# Patient Record
Sex: FEMALE | Race: WHITE | Employment: OTHER | URBAN - METROPOLITAN AREA
[De-identification: names, ages, dates, MRNs, and addresses within clinical notes are randomized per-mention and may not be internally consistent; named-entity substitution may affect disease eponyms.]

---

## 2017-03-13 ENCOUNTER — OFFICE VISIT (OUTPATIENT)
Dept: FAMILY MEDICINE CLINIC | Facility: CLINIC | Age: 72
End: 2017-03-13

## 2017-03-13 VITALS
BODY MASS INDEX: 37.49 KG/M2 | TEMPERATURE: 98 F | HEART RATE: 100 BPM | SYSTOLIC BLOOD PRESSURE: 128 MMHG | DIASTOLIC BLOOD PRESSURE: 70 MMHG | HEIGHT: 65 IN | WEIGHT: 225 LBS

## 2017-03-13 DIAGNOSIS — F32.A DEPRESSION, UNSPECIFIED DEPRESSION TYPE: ICD-10-CM

## 2017-03-13 DIAGNOSIS — Z12.31 VISIT FOR SCREENING MAMMOGRAM: ICD-10-CM

## 2017-03-13 DIAGNOSIS — Z98.890 S/P SKIN BIOPSY: ICD-10-CM

## 2017-03-13 DIAGNOSIS — Q63.1 HORSESHOE KIDNEY: ICD-10-CM

## 2017-03-13 DIAGNOSIS — Z87.81 HISTORY OF ARM FRACTURE: ICD-10-CM

## 2017-03-13 DIAGNOSIS — I10 ESSENTIAL HYPERTENSION: ICD-10-CM

## 2017-03-13 DIAGNOSIS — H91.8X3 OTHER SPECIFIED HEARING LOSS OF BOTH EARS: ICD-10-CM

## 2017-03-13 DIAGNOSIS — Z00.00 ENCOUNTER FOR ANNUAL HEALTH EXAMINATION: Primary | ICD-10-CM

## 2017-03-13 DIAGNOSIS — E66.9 OBESITY (BMI 35.0-39.9 WITHOUT COMORBIDITY): ICD-10-CM

## 2017-03-13 DIAGNOSIS — L81.9 CHANGE IN MULTIPLE PIGMENTED SKIN LESIONS: ICD-10-CM

## 2017-03-13 PROCEDURE — 99397 PER PM REEVAL EST PAT 65+ YR: CPT | Performed by: FAMILY MEDICINE

## 2017-03-13 PROCEDURE — G0439 PPPS, SUBSEQ VISIT: HCPCS | Performed by: FAMILY MEDICINE

## 2017-03-13 PROCEDURE — 96160 PT-FOCUSED HLTH RISK ASSMT: CPT | Performed by: FAMILY MEDICINE

## 2017-03-13 RX ORDER — CITALOPRAM 20 MG/1
20 TABLET ORAL DAILY
Qty: 90 TABLET | Refills: 1 | Status: SHIPPED | OUTPATIENT
Start: 2017-03-13 | End: 2017-09-15

## 2017-03-13 RX ORDER — METOPROLOL SUCCINATE 50 MG/1
50 TABLET, EXTENDED RELEASE ORAL DAILY
Qty: 90 TABLET | Refills: 1 | Status: SHIPPED | OUTPATIENT
Start: 2017-03-13 | End: 2017-12-06

## 2017-03-13 NOTE — PROGRESS NOTES
HPI:   Kelly Rosales is a 81year old female who presents for a Medicare Subsequent Annual Wellness visit (Pt already had Initial Annual Wellness). Goes to skin doctor in [facility] - she did several skin lesions. Now having one grow back in the same area. Succinate ER 50 MG Oral Tablet 24 Hr Take 1 tablet (50 mg total) by mouth daily. Cholecalciferol (VITAMIN D) 2000 UNITS Oral Cap Take by mouth. Fish Oil-Cholecalciferol (FISH OIL + D3 OR) Take by mouth.    aspirin 81 MG Oral Tab Take 81 mg by mouth briseyda for AWV/SWV)    Questionnaire     Visual Acuity - following with eye doctor, s/p cataract surgery, last seen in September.    Right Eye Visual Acuity: Uncorrected Right Eye Chart Acuity: 20/100   Left Eye Visual Acuity: Uncorrected Left Eye Chart Acuity: 20 Slade Hernandez is a 81year old female who presents for a Medicare Assessment.      PLAN SUMMARY:   Diagnoses and all orders for this visit:    Encounter for annual health examination  - completed today   Visit for screening mammogram  -     Mammogram Digital Scr lost more than 10 pounds without trying?: 2 - No    Has your appetite been poor?: No    How does the patient maintain a good energy level?: Other    How would you describe your daily physical activity?: Light    How would you describe your current health s Advance Directives     Do you have a healthcare power of ?: No    Do you have a living will?: No     Please go to \"Cognitive Assessment\" under Medicare Assessment section in Charting, test patient and document.     Then, refresh your progress found for: CHLAMYDIA No flowsheet data found.     Screening Mammogram      Mammogram Annually to 78, then as discussed Mammogram,1 Yr due on 01/12/1987 Update Health Maintenance if applicable     Immunizations      Influenza No orders found for this or any

## 2017-03-13 NOTE — PATIENT INSTRUCTIONS
Tang Johns's SCREENING SCHEDULE   Tests on this list are recommended by your physician but may not be covered, or covered at this frequency, by your insurer. Please check with your insurance carrier before scheduling to verify coverage.    PREVENTATIVE Sigmoidoscopy Screen  Covered every 5 years No results found for this or any previous visit. No flowsheet data found. Fecal Occult Blood   Covered Annually No results found for: FOB, OCCULTSTOOL No flowsheet data found.      Barium Enema-   uncomfortabl for Moderate/High Risk       No orders found for this or any previous visit.  Medium/high risk factors:   End-stage renal disease   Hemophiliacs who received Factor VIII or IX concentrates   Clients of institutions for the mentally retarded   Persons who li

## 2017-03-15 ENCOUNTER — NURSE ONLY (OUTPATIENT)
Dept: FAMILY MEDICINE CLINIC | Facility: CLINIC | Age: 72
End: 2017-03-15

## 2017-03-15 DIAGNOSIS — N28.9 FUNCTION KIDNEY DECREASED: ICD-10-CM

## 2017-03-15 DIAGNOSIS — F32.A DEPRESSION, UNSPECIFIED DEPRESSION TYPE: ICD-10-CM

## 2017-03-15 DIAGNOSIS — E66.9 OBESITY (BMI 35.0-39.9 WITHOUT COMORBIDITY): ICD-10-CM

## 2017-03-15 PROCEDURE — 80061 LIPID PANEL: CPT | Performed by: FAMILY MEDICINE

## 2017-03-15 PROCEDURE — 80053 COMPREHEN METABOLIC PANEL: CPT | Performed by: FAMILY MEDICINE

## 2017-03-15 PROCEDURE — 85025 COMPLETE CBC W/AUTO DIFF WBC: CPT | Performed by: FAMILY MEDICINE

## 2017-03-15 PROCEDURE — 84443 ASSAY THYROID STIM HORMONE: CPT | Performed by: FAMILY MEDICINE

## 2017-03-15 PROCEDURE — 82306 VITAMIN D 25 HYDROXY: CPT | Performed by: FAMILY MEDICINE

## 2017-03-15 PROCEDURE — 83036 HEMOGLOBIN GLYCOSYLATED A1C: CPT | Performed by: FAMILY MEDICINE

## 2017-03-16 DIAGNOSIS — E66.01 MORBID OBESITY DUE TO EXCESS CALORIES (HCC): Primary | ICD-10-CM

## 2017-03-16 DIAGNOSIS — R73.03 PREDIABETES: ICD-10-CM

## 2017-03-16 LAB
25-HYDROXYVITAMIN D (TOTAL): 33.9 NG/ML (ref 30–100)
ALBUMIN SERPL-MCNC: 3.7 G/DL (ref 3.5–4.8)
ALP LIVER SERPL-CCNC: 69 U/L (ref 55–142)
ALT SERPL-CCNC: 23 U/L (ref 14–54)
AST SERPL-CCNC: 19 U/L (ref 15–41)
BASOPHILS # BLD AUTO: 0.05 X10(3) UL (ref 0–0.1)
BASOPHILS NFR BLD AUTO: 0.8 %
BILIRUB SERPL-MCNC: 0.3 MG/DL (ref 0.1–2)
BUN BLD-MCNC: 20 MG/DL (ref 8–20)
CALCIUM BLD-MCNC: 8.8 MG/DL (ref 8.3–10.3)
CHLORIDE: 107 MMOL/L (ref 101–111)
CHOLEST SMN-MCNC: 194 MG/DL (ref ?–200)
CO2: 29 MMOL/L (ref 22–32)
CREAT BLD-MCNC: 1.13 MG/DL (ref 0.55–1.02)
EOSINOPHIL # BLD AUTO: 0.14 X10(3) UL (ref 0–0.3)
EOSINOPHIL NFR BLD AUTO: 2.2 %
ERYTHROCYTE [DISTWIDTH] IN BLOOD BY AUTOMATED COUNT: 12.9 % (ref 11.5–16)
EST. AVERAGE GLUCOSE BLD GHB EST-MCNC: 128 MG/DL (ref 68–126)
GLUCOSE BLD-MCNC: 115 MG/DL (ref 70–99)
HBA1C MFR BLD HPLC: 6.1 % (ref ?–5.7)
HCT VFR BLD AUTO: 37.9 % (ref 34–50)
HDLC SERPL-MCNC: 52 MG/DL (ref 45–?)
HDLC SERPL: 3.73 {RATIO} (ref ?–4.44)
HGB BLD-MCNC: 11.9 G/DL (ref 12–16)
IMMATURE GRANULOCYTE COUNT: 0.01 X10(3) UL (ref 0–1)
IMMATURE GRANULOCYTE RATIO %: 0.2 %
LDLC SERPL CALC-MCNC: 108 MG/DL (ref ?–130)
LYMPHOCYTES # BLD AUTO: 2.25 X10(3) UL (ref 0.9–4)
LYMPHOCYTES NFR BLD AUTO: 35.2 %
M PROTEIN MFR SERPL ELPH: 7.5 G/DL (ref 6.1–8.3)
MCH RBC QN AUTO: 27.3 PG (ref 27–33.2)
MCHC RBC AUTO-ENTMCNC: 31.4 G/DL (ref 31–37)
MCV RBC AUTO: 86.9 FL (ref 81–100)
MONOCYTES # BLD AUTO: 0.46 X10(3) UL (ref 0.1–0.6)
MONOCYTES NFR BLD AUTO: 7.2 %
NEUTROPHIL ABS PRELIM: 3.48 X10 (3) UL (ref 1.3–6.7)
NEUTROPHILS # BLD AUTO: 3.48 X10(3) UL (ref 1.3–6.7)
NEUTROPHILS NFR BLD AUTO: 54.4 %
NONHDLC SERPL-MCNC: 142 MG/DL (ref ?–130)
PLATELET # BLD AUTO: 262 10(3)UL (ref 150–450)
POTASSIUM SERPL-SCNC: 4 MMOL/L (ref 3.6–5.1)
RBC # BLD AUTO: 4.36 X10(6)UL (ref 3.8–5.1)
RED CELL DISTRIBUTION WIDTH-SD: 40.7 FL (ref 35.1–46.3)
SODIUM SERPL-SCNC: 143 MMOL/L (ref 136–144)
TRIGLYCERIDES: 170 MG/DL (ref ?–150)
TSI SER-ACNC: 3.44 MIU/ML (ref 0.35–5.5)
VLDL: 34 MG/DL (ref 5–40)
WBC # BLD AUTO: 6.4 X10(3) UL (ref 4–13)

## 2017-03-16 NOTE — TELEPHONE ENCOUNTER
Called and spoke with pt regarding labs. Still in prediabetic range. Cholesterol is good. Renal function stable. Blood cell counts good. Pt still would like to go back on qsymia, scripts printed. Can these be faxed or does she need to ?  Pt advised

## 2017-03-16 NOTE — TELEPHONE ENCOUNTER
Left message on voicemail/answering machine for patient to call office    Both scripts have been faxed.   Will need to take lower dose Qsymia 3.75-23 for 2 weeks first, then increase to 7.5-46

## 2017-03-20 NOTE — TELEPHONE ENCOUNTER
Qsymia 3.75 sent as #30  1 refill,  Qsymia 7.5 sent for 14 days only. Will need to resend 3.75 as #14 and 7.5 as #30  1 refill    Per pharmacy patient has not picked up script yet.     Routed to Dr Chiquis Hansen to place order

## 2017-04-03 ENCOUNTER — OFFICE VISIT (OUTPATIENT)
Dept: FAMILY MEDICINE CLINIC | Facility: CLINIC | Age: 72
End: 2017-04-03

## 2017-04-03 VITALS
TEMPERATURE: 98 F | DIASTOLIC BLOOD PRESSURE: 66 MMHG | BODY MASS INDEX: 38 KG/M2 | SYSTOLIC BLOOD PRESSURE: 130 MMHG | WEIGHT: 226.63 LBS | OXYGEN SATURATION: 98 % | HEART RATE: 112 BPM | RESPIRATION RATE: 16 BRPM

## 2017-04-03 DIAGNOSIS — R06.00 DOE (DYSPNEA ON EXERTION): Primary | ICD-10-CM

## 2017-04-03 DIAGNOSIS — R00.0 TACHYCARDIA: ICD-10-CM

## 2017-04-03 DIAGNOSIS — R42 VERTIGO: ICD-10-CM

## 2017-04-03 PROBLEM — E66.01 SEVERE OBESITY (BMI 35.0-39.9) WITH COMORBIDITY (HCC): Chronic | Status: ACTIVE | Noted: 2017-04-03

## 2017-04-03 PROCEDURE — 99214 OFFICE O/P EST MOD 30 MIN: CPT | Performed by: FAMILY MEDICINE

## 2017-04-03 PROCEDURE — 93000 ELECTROCARDIOGRAM COMPLETE: CPT | Performed by: FAMILY MEDICINE

## 2017-04-03 RX ORDER — MECLIZINE HYDROCHLORIDE CHEWABLE TABLETS 25 MG/1
25 TABLET, CHEWABLE ORAL 3 TIMES DAILY PRN
Qty: 30 TABLET | Refills: 0 | Status: SHIPPED | OUTPATIENT
Start: 2017-04-03 | End: 2017-07-19 | Stop reason: ALTCHOICE

## 2017-04-03 RX ORDER — PREDNISONE 20 MG/1
20 TABLET ORAL DAILY
Qty: 5 TABLET | Refills: 0 | Status: SHIPPED | OUTPATIENT
Start: 2017-04-03 | End: 2017-04-08

## 2017-04-03 NOTE — PROGRESS NOTES
Basia Vergara is a 81year old female. Patient presents with:  Dizziness: balance problems, head hurts per pt, vision changes      HPI:   Started getting sick 3 weeks ago. Started vomiting 3 weeks ago, then again this weekend. Minimal diarrhea.  Since then by mouth daily. Disp:  Rfl:    CINNAMON OR Take by mouth. Disp:  Rfl:    B Complex Vitamins (VITAMIN B COMPLEX OR) Inject as directed.  Disp:  Rfl:       Past Medical History   Diagnosis Date   • Arm fracture, right 2016   • Diabetes ([facility] [address]    • Depression Report -IN OFFICE [70300]    Vertigo  -     predniSONE 20 MG Oral Tab; Take 1 tablet (20 mg total) by mouth daily.  -     Meclizine HCl 25 MG Oral Chew Tab; Chew 25 mg by mouth 3 (three) times daily as needed.   - EKG as above, nothing concerning.   - treat

## 2017-04-17 ENCOUNTER — MED REC SCAN ONLY (OUTPATIENT)
Dept: FAMILY MEDICINE CLINIC | Facility: CLINIC | Age: 72
End: 2017-04-17

## 2017-06-13 ENCOUNTER — MED REC SCAN ONLY (OUTPATIENT)
Dept: FAMILY MEDICINE CLINIC | Facility: CLINIC | Age: 72
End: 2017-06-13

## 2017-07-19 ENCOUNTER — OFFICE VISIT (OUTPATIENT)
Dept: FAMILY MEDICINE CLINIC | Facility: CLINIC | Age: 72
End: 2017-07-19

## 2017-07-19 VITALS
TEMPERATURE: 98 F | DIASTOLIC BLOOD PRESSURE: 88 MMHG | RESPIRATION RATE: 16 BRPM | HEART RATE: 104 BPM | SYSTOLIC BLOOD PRESSURE: 130 MMHG

## 2017-07-19 DIAGNOSIS — I10 ESSENTIAL HYPERTENSION: ICD-10-CM

## 2017-07-19 DIAGNOSIS — F32.A DEPRESSION, UNSPECIFIED DEPRESSION TYPE: Primary | ICD-10-CM

## 2017-07-19 PROCEDURE — 99214 OFFICE O/P EST MOD 30 MIN: CPT | Performed by: FAMILY MEDICINE

## 2017-07-19 NOTE — PROGRESS NOTES
Hao Lafleur is a 81year old female.   HPI:   Lb,is here for follow up on depression related to her adult daughter who lives with her and her two young sons, Tang Anderson takes care of the boys and her daughter has psychological issues that incapacitated and Tang Anderson t suspicious lesions  HEENT: atraumatic, normocephalic,ears and throat are clear  NECK: supple,no adenopathy,no bruits  LUNGS: clear to auscultation  CARDIO: RRR without murmur  GI: good BS's,no masses, HSM or tenderness  EXTREMITIES: no cyanosis, clubbing o

## 2017-09-05 RX ORDER — PHENTERMINE AND TOPIRAMATE 7.5; 46 MG/1; MG/1
CAPSULE, EXTENDED RELEASE ORAL
Qty: 30 CAPSULE | Refills: 0 | Status: SHIPPED | OUTPATIENT
Start: 2017-09-05 | End: 2018-07-16

## 2017-09-15 DIAGNOSIS — F32.A DEPRESSION, UNSPECIFIED DEPRESSION TYPE: ICD-10-CM

## 2017-09-15 RX ORDER — CITALOPRAM 20 MG/1
TABLET ORAL
Qty: 90 TABLET | Refills: 0 | Status: SHIPPED | OUTPATIENT
Start: 2017-09-15 | End: 2017-09-16

## 2017-09-15 NOTE — TELEPHONE ENCOUNTER
Script sent. Looks like Dr. Gonzalez Chinchilla adjusted her dose to 30 mg. Can she follow up with me? Did I send in the right dose? Is that what she is taking?

## 2017-09-16 RX ORDER — CITALOPRAM 20 MG/1
30 TABLET ORAL
Qty: 135 TABLET | Refills: 0 | Status: SHIPPED | OUTPATIENT
Start: 2017-09-16 | End: 2017-09-25

## 2017-09-16 NOTE — TELEPHONE ENCOUNTER
Patient states that she is taking 1.5 tabs daily to = 30 mg. Appointment scheduled.    Future Appointments  Date Time Provider Justine Humphries   9/25/2017 8:00 AM Abran Hudson [facility] ALEKS Clarke

## 2017-09-25 ENCOUNTER — OFFICE VISIT (OUTPATIENT)
Dept: FAMILY MEDICINE CLINIC | Facility: CLINIC | Age: 72
End: 2017-09-25

## 2017-09-25 VITALS
SYSTOLIC BLOOD PRESSURE: 124 MMHG | RESPIRATION RATE: 16 BRPM | WEIGHT: 225 LBS | HEART RATE: 70 BPM | BODY MASS INDEX: 37 KG/M2 | TEMPERATURE: 99 F | DIASTOLIC BLOOD PRESSURE: 70 MMHG

## 2017-09-25 DIAGNOSIS — I10 ESSENTIAL HYPERTENSION: ICD-10-CM

## 2017-09-25 DIAGNOSIS — F32.A DEPRESSION, UNSPECIFIED DEPRESSION TYPE: Primary | ICD-10-CM

## 2017-09-25 DIAGNOSIS — E66.01 SEVERE OBESITY (BMI 35.0-39.9) WITH COMORBIDITY (HCC): Chronic | ICD-10-CM

## 2017-09-25 DIAGNOSIS — Z82.49 FAMILY HISTORY OF HEART DISEASE: ICD-10-CM

## 2017-09-25 PROCEDURE — 99214 OFFICE O/P EST MOD 30 MIN: CPT | Performed by: FAMILY MEDICINE

## 2017-09-25 RX ORDER — CITALOPRAM 20 MG/1
30 TABLET ORAL
Qty: 135 TABLET | Refills: 0 | Status: SHIPPED | OUTPATIENT
Start: 2017-09-25 | End: 2018-07-16

## 2017-09-25 NOTE — PROGRESS NOTES
Denis Hernandez is a 81year old female. Patient presents with:  Medication Follow-Up: per pt      HPI:   Her daughter and her two kids live with her. 5year old twins.  Happier now, \"less groaning and moaning\" getting up in the AM. Has generally be nando Alcohol use: Yes           0.0 oz/week     Comment: occ       BP Readings from Last 6 Encounters:  09/25/17 : 124/70  07/19/17 : 130/88  04/03/17 : 130/66  03/13/17 : 128/70  04/19/16 : 124/82      Wt Readings from Last 6 Encounters:  09/ orders of the defined types were placed in this encounter. Meds & Refills for this Visit:  Signed Prescriptions Disp Refills    Citalopram Hydrobromide 20 MG Oral Tab 135 tablet 0      Sig: Take 1.5 tablets (30 mg total) by mouth once daily.

## 2017-09-27 ENCOUNTER — PATIENT OUTREACH (OUTPATIENT)
Dept: FAMILY MEDICINE CLINIC | Facility: CLINIC | Age: 72
End: 2017-09-27

## 2017-09-27 NOTE — PROGRESS NOTES
Betzy Davila is due for mammogram.   Last mammogram date:  10/19/15  Also due for colonoscopy. Mychart/Letter sent to patient.

## 2017-09-29 ENCOUNTER — TELEPHONE (OUTPATIENT)
Dept: FAMILY MEDICINE CLINIC | Facility: CLINIC | Age: 72
End: 2017-09-29

## 2017-09-29 NOTE — TELEPHONE ENCOUNTER
Mammogram results received from [facility] and reviewed by Dr Bob Mazariegos. Per Dr Bob Mazariegos, normal mammogram. Repeat in 1 year    Patient notified via detailed voicemail left at cell number (ok per  HIPAA consent)    Report sent to scanning.

## 2017-12-06 ENCOUNTER — OFFICE VISIT (OUTPATIENT)
Dept: FAMILY MEDICINE CLINIC | Facility: CLINIC | Age: 72
End: 2017-12-06

## 2017-12-06 VITALS
BODY MASS INDEX: 37.12 KG/M2 | SYSTOLIC BLOOD PRESSURE: 130 MMHG | HEIGHT: 66 IN | WEIGHT: 231 LBS | HEART RATE: 84 BPM | TEMPERATURE: 99 F | DIASTOLIC BLOOD PRESSURE: 88 MMHG | RESPIRATION RATE: 16 BRPM

## 2017-12-06 DIAGNOSIS — E66.01 SEVERE OBESITY (BMI 35.0-39.9) WITH COMORBIDITY (HCC): Primary | ICD-10-CM

## 2017-12-06 DIAGNOSIS — I10 ESSENTIAL HYPERTENSION: ICD-10-CM

## 2017-12-06 DIAGNOSIS — Q63.1 HORSESHOE KIDNEY: ICD-10-CM

## 2017-12-06 PROCEDURE — 80061 LIPID PANEL: CPT | Performed by: FAMILY MEDICINE

## 2017-12-06 PROCEDURE — 80053 COMPREHEN METABOLIC PANEL: CPT | Performed by: FAMILY MEDICINE

## 2017-12-06 PROCEDURE — 87086 URINE CULTURE/COLONY COUNT: CPT | Performed by: FAMILY MEDICINE

## 2017-12-06 PROCEDURE — 36415 COLL VENOUS BLD VENIPUNCTURE: CPT | Performed by: FAMILY MEDICINE

## 2017-12-06 PROCEDURE — 81001 URINALYSIS AUTO W/SCOPE: CPT | Performed by: FAMILY MEDICINE

## 2017-12-06 PROCEDURE — 85025 COMPLETE CBC W/AUTO DIFF WBC: CPT | Performed by: FAMILY MEDICINE

## 2017-12-06 PROCEDURE — 99214 OFFICE O/P EST MOD 30 MIN: CPT | Performed by: FAMILY MEDICINE

## 2017-12-06 PROCEDURE — 84443 ASSAY THYROID STIM HORMONE: CPT | Performed by: FAMILY MEDICINE

## 2017-12-06 PROCEDURE — 83036 HEMOGLOBIN GLYCOSYLATED A1C: CPT | Performed by: FAMILY MEDICINE

## 2017-12-06 RX ORDER — METOPROLOL SUCCINATE 50 MG/1
TABLET, EXTENDED RELEASE ORAL
Qty: 90 TABLET | Refills: 3 | Status: SHIPPED | OUTPATIENT
Start: 2017-12-06 | End: 2018-11-20

## 2017-12-06 NOTE — PROGRESS NOTES
Oralia Ganser is a 81year old female. Patient presents with:  Weight Problem: gaining weight per pt      HPI:   Has been obese most of her life. Has trouble walking, getting slower than she was. Has been to [facility], [facility].  He t Last 6 Encounters:  12/06/17 : 231 lb  09/25/17 : 225 lb  04/03/17 : 226 lb 9.6 oz  03/13/17 : 225 lb  04/19/16 : 215 lb      REVIEW OF SYSTEMS:   GENERAL HEALTH: feels well no complaints  SKIN: denies any unusual skin lesions or rashes   RESPIRATORY: vivian requested or ordered in this encounter          The patient indicates understanding of these issues and agrees to the plan.

## 2017-12-06 NOTE — TELEPHONE ENCOUNTER
LOV   09/25/2017  Last refill  03/13/2017   #90 w. 1 RF  Future Appointments  Date Time Provider Justine Humphries   12/6/2017 11:00 AM DO JOSE Harris [facility] Bhavna Garner

## 2017-12-07 ENCOUNTER — TELEPHONE (OUTPATIENT)
Dept: FAMILY MEDICINE CLINIC | Facility: CLINIC | Age: 72
End: 2017-12-07

## 2017-12-07 DIAGNOSIS — Z00.00 PREVENTATIVE HEALTH CARE: Primary | ICD-10-CM

## 2017-12-07 NOTE — TELEPHONE ENCOUNTER
Provider called, pt had some labs drawn there and they need the pt to come back for a redraw. Provider would like to discuss with a nurse.    Please call Wilmar at [phone]

## 2017-12-08 NOTE — TELEPHONE ENCOUNTER
----- Message from Carla Fitzgerald DO sent at 12/8/2017  9:25 AM CST -----  Pls let pt know: Looks like specimen was hemolyzed and lab suggested re-drawing her for more accurate results. The thyroid is normal, and hemolysis should not affect that.  Please e

## 2017-12-08 NOTE — TELEPHONE ENCOUNTER
Pt coming in for re-draw CMP tomorrow - please place orders    Future Appointments  Date Time Provider Justine Humphries   12/9/2017 9:00 AM  [facility] Marianne Patrick

## 2017-12-09 ENCOUNTER — NURSE ONLY (OUTPATIENT)
Dept: FAMILY MEDICINE CLINIC | Facility: CLINIC | Age: 72
End: 2017-12-09

## 2017-12-09 DIAGNOSIS — Z00.00 PREVENTATIVE HEALTH CARE: ICD-10-CM

## 2017-12-09 PROCEDURE — 36415 COLL VENOUS BLD VENIPUNCTURE: CPT | Performed by: FAMILY MEDICINE

## 2017-12-09 PROCEDURE — 80053 COMPREHEN METABOLIC PANEL: CPT | Performed by: FAMILY MEDICINE

## 2018-07-10 ENCOUNTER — TELEPHONE (OUTPATIENT)
Dept: FAMILY MEDICINE CLINIC | Facility: CLINIC | Age: 73
End: 2018-07-10

## 2018-07-10 NOTE — TELEPHONE ENCOUNTER
Patient reports she is going to have lap-band procedure with Dr. Mickey Haq @ [facility]. Kaci Kilgore is seeing his nurse on Thursday, will ask her to forward his requirements for pre op labs.

## 2018-07-12 ENCOUNTER — NURSE ONLY (OUTPATIENT)
Dept: FAMILY MEDICINE CLINIC | Facility: CLINIC | Age: 73
End: 2018-07-12

## 2018-07-12 DIAGNOSIS — Z01.818 PREOP TESTING: Primary | ICD-10-CM

## 2018-07-12 DIAGNOSIS — R73.01 ELEVATED FASTING GLUCOSE: ICD-10-CM

## 2018-07-12 LAB
ALBUMIN SERPL-MCNC: 3.8 G/DL (ref 3.5–4.8)
ALP LIVER SERPL-CCNC: 69 U/L (ref 55–142)
ALT SERPL-CCNC: 25 U/L (ref 14–54)
AST SERPL-CCNC: 20 U/L (ref 15–41)
BASOPHILS # BLD AUTO: 0.04 X10(3) UL (ref 0–0.1)
BASOPHILS NFR BLD AUTO: 0.5 %
BILIRUB SERPL-MCNC: 0.2 MG/DL (ref 0.1–2)
BUN BLD-MCNC: 21 MG/DL (ref 8–20)
CALCIUM BLD-MCNC: 8.9 MG/DL (ref 8.3–10.3)
CHLORIDE: 109 MMOL/L (ref 101–111)
CO2: 26 MMOL/L (ref 22–32)
CREAT BLD-MCNC: 1.26 MG/DL (ref 0.55–1.02)
EOSINOPHIL # BLD AUTO: 0.14 X10(3) UL (ref 0–0.3)
EOSINOPHIL NFR BLD AUTO: 1.9 %
ERYTHROCYTE [DISTWIDTH] IN BLOOD BY AUTOMATED COUNT: 17.6 % (ref 11.5–16)
GLUCOSE BLD-MCNC: 139 MG/DL (ref 70–99)
HCT VFR BLD AUTO: 39.6 % (ref 34–50)
HGB BLD-MCNC: 12.1 G/DL (ref 12–16)
IMMATURE GRANULOCYTE COUNT: 0.01 X10(3) UL (ref 0–1)
IMMATURE GRANULOCYTE RATIO %: 0.1 %
LYMPHOCYTES # BLD AUTO: 2.45 X10(3) UL (ref 0.9–4)
LYMPHOCYTES NFR BLD AUTO: 32.7 %
M PROTEIN MFR SERPL ELPH: 7.6 G/DL (ref 6.1–8.3)
MCH RBC QN AUTO: 26 PG (ref 27–33.2)
MCHC RBC AUTO-ENTMCNC: 30.6 G/DL (ref 31–37)
MCV RBC AUTO: 85.2 FL (ref 81–100)
MONOCYTES # BLD AUTO: 0.35 X10(3) UL (ref 0.1–1)
MONOCYTES NFR BLD AUTO: 4.7 %
NEUTROPHIL ABS PRELIM: 4.5 X10 (3) UL (ref 1.3–6.7)
NEUTROPHILS # BLD AUTO: 4.5 X10(3) UL (ref 1.3–6.7)
NEUTROPHILS NFR BLD AUTO: 60.1 %
PLATELET # BLD AUTO: 265 10(3)UL (ref 150–450)
POTASSIUM SERPL-SCNC: 4.2 MMOL/L (ref 3.6–5.1)
RBC # BLD AUTO: 4.65 X10(6)UL (ref 3.8–5.1)
RED CELL DISTRIBUTION WIDTH-SD: 54.1 FL (ref 35.1–46.3)
SODIUM SERPL-SCNC: 143 MMOL/L (ref 136–144)
WBC # BLD AUTO: 7.5 X10(3) UL (ref 4–13)

## 2018-07-12 PROCEDURE — 36415 COLL VENOUS BLD VENIPUNCTURE: CPT | Performed by: FAMILY MEDICINE

## 2018-07-12 PROCEDURE — 83036 HEMOGLOBIN GLYCOSYLATED A1C: CPT | Performed by: FAMILY MEDICINE

## 2018-07-12 PROCEDURE — 85025 COMPLETE CBC W/AUTO DIFF WBC: CPT | Performed by: FAMILY MEDICINE

## 2018-07-12 PROCEDURE — 80053 COMPREHEN METABOLIC PANEL: CPT | Performed by: FAMILY MEDICINE

## 2018-07-12 NOTE — PROGRESS NOTES
Patient to clinic with orders for preop labs. Orders reviewed by Dr Cindy Downey who gave verbal order for CBC and CMP.   Orders placed    Mint and lavender tube drawn right AC x 1 attempt

## 2018-07-13 DIAGNOSIS — Q63.1 HORSESHOE KIDNEY: Primary | ICD-10-CM

## 2018-07-13 DIAGNOSIS — R73.01 ELEVATED FASTING GLUCOSE: Primary | ICD-10-CM

## 2018-07-13 LAB
EST. AVERAGE GLUCOSE BLD GHB EST-MCNC: 123 MG/DL (ref 68–126)
HBA1C MFR BLD HPLC: 5.9 % (ref ?–5.7)

## 2018-07-16 ENCOUNTER — OFFICE VISIT (OUTPATIENT)
Dept: FAMILY MEDICINE CLINIC | Facility: CLINIC | Age: 73
End: 2018-07-16

## 2018-07-16 VITALS
BODY MASS INDEX: 36.46 KG/M2 | WEIGHT: 218.81 LBS | HEART RATE: 88 BPM | SYSTOLIC BLOOD PRESSURE: 144 MMHG | TEMPERATURE: 98 F | DIASTOLIC BLOOD PRESSURE: 80 MMHG | RESPIRATION RATE: 10 BRPM | HEIGHT: 65 IN

## 2018-07-16 DIAGNOSIS — F32.A DEPRESSION, UNSPECIFIED DEPRESSION TYPE: ICD-10-CM

## 2018-07-16 DIAGNOSIS — E66.01 SEVERE OBESITY (BMI 35.0-39.9) WITH COMORBIDITY (HCC): Primary | ICD-10-CM

## 2018-07-16 DIAGNOSIS — Q63.1 HORSESHOE KIDNEY: ICD-10-CM

## 2018-07-16 DIAGNOSIS — I10 ESSENTIAL HYPERTENSION: ICD-10-CM

## 2018-07-16 PROBLEM — E11.9 DIABETES MELLITUS, TYPE 2 (HCC): Chronic | Status: ACTIVE | Noted: 2018-07-16

## 2018-07-16 PROBLEM — E11.9 DIABETES MELLITUS, TYPE 2 (HCC): Chronic | Status: RESOLVED | Noted: 2018-07-16 | Resolved: 2018-07-16

## 2018-07-16 PROCEDURE — 99214 OFFICE O/P EST MOD 30 MIN: CPT | Performed by: FAMILY MEDICINE

## 2018-07-16 RX ORDER — CITALOPRAM 20 MG/1
20 TABLET ORAL
Qty: 90 TABLET | Refills: 1 | Status: SHIPPED | OUTPATIENT
Start: 2018-07-16 | End: 2018-12-04

## 2018-07-16 NOTE — H&P
Lisandro Woody is a 72year old female who presents for a pre-operative physical exam. Patient is to have lap band bariatric surgery, to be done by Dr. Lalo Rosario at [facility] in [facility] on 7/24/18. HPI:   Pt complains of nothing new today. minimal.  Diet: watches minimally     REVIEW OF SYSTEMS:   GENERAL: feels well otherwise  SKIN: denies any unusual skin lesions  EYES:denies blurred vision or double vision  HEENT: denies nasal congestion, sinus pain or ST  LUNGS: denies shortness of breat hypertension     Obesity (BMI 35.0-39.9 without comorbidity)     History of arm fracture     Horseshoe kidney     Severe obesity (BMI 35.0-39. 9) with comorbidity ([address]    . Pt has no significant history of cardiac or pulmonary conditions other than HTN.  He

## 2018-07-17 ENCOUNTER — TELEPHONE (OUTPATIENT)
Dept: FAMILY MEDICINE CLINIC | Facility: CLINIC | Age: 73
End: 2018-07-17

## 2018-07-17 ENCOUNTER — NURSE ONLY (OUTPATIENT)
Dept: FAMILY MEDICINE CLINIC | Facility: CLINIC | Age: 73
End: 2018-07-17
Payer: COMMERCIAL

## 2018-07-17 DIAGNOSIS — Z01.818 PREOP TESTING: Primary | ICD-10-CM

## 2018-07-17 PROCEDURE — 93000 ELECTROCARDIOGRAM COMPLETE: CPT | Performed by: FAMILY MEDICINE

## 2018-07-17 NOTE — TELEPHONE ENCOUNTER
Surgeons office calling to request patient have an EKG done. Patient notified via detailed voicemail left at cell number (ok per  HIPAA consent) that she needs an EKG.  Advised to call office to schedule nurse visit to have test done

## 2018-07-18 NOTE — PROGRESS NOTES
EKG showed NSR, no ST changes or elevation or depression. No arrhythmias. It appeared unchanged from EKG done 4/3/17.

## 2018-09-13 ENCOUNTER — TELEPHONE (OUTPATIENT)
Dept: FAMILY MEDICINE CLINIC | Facility: CLINIC | Age: 73
End: 2018-09-13

## 2018-09-13 NOTE — TELEPHONE ENCOUNTER
Letter mailed to patient reminding her she is due for labs.   Lab Frequency Next Occurrence   BASIC METABOLIC PANEL (8) Once 79/14/3506

## 2018-10-26 ENCOUNTER — MED REC SCAN ONLY (OUTPATIENT)
Dept: FAMILY MEDICINE CLINIC | Facility: CLINIC | Age: 73
End: 2018-10-26

## 2018-11-13 ENCOUNTER — PATIENT OUTREACH (OUTPATIENT)
Dept: FAMILY MEDICINE CLINIC | Facility: CLINIC | Age: 73
End: 2018-11-13

## 2018-11-20 DIAGNOSIS — I10 ESSENTIAL HYPERTENSION: ICD-10-CM

## 2018-11-20 RX ORDER — METOPROLOL SUCCINATE 50 MG/1
TABLET, EXTENDED RELEASE ORAL
Qty: 90 TABLET | Refills: 3 | Status: SHIPPED | OUTPATIENT
Start: 2018-11-20 | End: 2018-12-04

## 2018-12-04 ENCOUNTER — OFFICE VISIT (OUTPATIENT)
Dept: FAMILY MEDICINE CLINIC | Facility: CLINIC | Age: 73
End: 2018-12-04
Payer: COMMERCIAL

## 2018-12-04 VITALS
BODY MASS INDEX: 31.76 KG/M2 | HEART RATE: 90 BPM | TEMPERATURE: 98 F | WEIGHT: 190.63 LBS | SYSTOLIC BLOOD PRESSURE: 110 MMHG | HEIGHT: 65 IN | DIASTOLIC BLOOD PRESSURE: 80 MMHG | RESPIRATION RATE: 10 BRPM

## 2018-12-04 DIAGNOSIS — I10 ESSENTIAL HYPERTENSION: ICD-10-CM

## 2018-12-04 DIAGNOSIS — E66.9 OBESITY (BMI 30.0-34.9): ICD-10-CM

## 2018-12-04 DIAGNOSIS — F32.A DEPRESSION, UNSPECIFIED DEPRESSION TYPE: ICD-10-CM

## 2018-12-04 DIAGNOSIS — Q63.1 HORSESHOE KIDNEY: ICD-10-CM

## 2018-12-04 DIAGNOSIS — Z13.6 SCREENING FOR CARDIOVASCULAR CONDITION: ICD-10-CM

## 2018-12-04 DIAGNOSIS — F33.0 MILD RECURRENT MAJOR DEPRESSION (HCC): Chronic | ICD-10-CM

## 2018-12-04 DIAGNOSIS — Z78.0 POSTMENOPAUSAL STATE: ICD-10-CM

## 2018-12-04 DIAGNOSIS — Z00.00 ENCOUNTER FOR ANNUAL HEALTH EXAMINATION: Primary | ICD-10-CM

## 2018-12-04 PROCEDURE — 99397 PER PM REEVAL EST PAT 65+ YR: CPT | Performed by: FAMILY MEDICINE

## 2018-12-04 PROCEDURE — G0439 PPPS, SUBSEQ VISIT: HCPCS | Performed by: FAMILY MEDICINE

## 2018-12-04 PROCEDURE — 96160 PT-FOCUSED HLTH RISK ASSMT: CPT | Performed by: FAMILY MEDICINE

## 2018-12-04 RX ORDER — METOPROLOL SUCCINATE 50 MG/1
50 TABLET, EXTENDED RELEASE ORAL
Qty: 90 TABLET | Refills: 3 | Status: SHIPPED | OUTPATIENT
Start: 2018-12-04 | End: 2020-01-13

## 2018-12-04 RX ORDER — CITALOPRAM 20 MG/1
20 TABLET ORAL
Qty: 90 TABLET | Refills: 1 | Status: SHIPPED | OUTPATIENT
Start: 2018-12-04 | End: 2019-10-14

## 2018-12-04 NOTE — PROGRESS NOTES
HPI:   Thais Dozier is a 72year old female who presents for a Medicare Subsequent Annual Wellness visit (Pt already had Initial Annual Wellness). Goes to skin doctor regularly, no cancerous findings but several things were treated.      Weight loss Ulisses Gonzalez, DO:  Citalopram Hydrobromide 20 MG Oral Tab Take 1 tablet (20 mg total) by mouth once daily. Metoprolol Succinate ER 50 MG Oral Tablet 24 Hr Take 1 tablet (50 mg total) by mouth once daily. aspirin 81 MG Oral Tab Take 81 mg by mouth daily. seen in September.    Right Eye Visual Acuity: Uncorrected Right Eye Chart Acuity: 20/100   Left Eye Visual Acuity: Uncorrected Left Eye Chart Acuity: 20/70   Both Eyes Visual Acuity: Uncorrected Both Eyes Chart Acuity: 20/50   Able To Tolerate Visual Acuit all orders for this visit:    1. Encounter for annual health examination  Completed today.   - COMP METABOLIC PANEL (14); Future  - CBC WITH DIFFERENTIAL WITH PLATELET; Future  - will get last colonoscopy report.    - pt does mammograms at [facility], she would you describe your daily physical activity?: Light    How would you describe your current health state?: Good    How do you maintain positive mental well-being?: Social Interaction;Games; Visiting Friends; Visiting Family    If you are a male age 38-65 section provided for quick review of chart, separate sheet to patient  [address] Internal Lab or Procedure External Lab or Procedure   Diabetes Screening      HbgA1C   Annually HgbA1C (%)   Date Value   07/12/2018 5.9 No orders found for this or any previous visit.      Tetanus Orders placed or performed in visit on 06/23/16   • TETANUS AND DIPHTHERIA, [address] Internal Lab or Procedure External Lab or Procedure   Annual [facility] AMANDA

## 2018-12-04 NOTE — PATIENT INSTRUCTIONS
Vazquez Johns's SCREENING SCHEDULE   Tests on this list are recommended by your physician but may not be covered, or covered at this frequency, by your insurer. Please check with your insurance carrier before scheduling to verify coverage.    PREVENTATIV Covered every 10 years- more often if abnormal Colonoscopy due on 01/12/1945 Update [facility] if applicable    Flex Sigmoidoscopy Screen  Covered every 5 years No results found for this or any previous visit. No flowsheet data found.      Fecal O (Pneumovax)  Covered Once after 65 No orders found for this or any previous visit. Please get once after your 65th birthday    Hepatitis B for Moderate/High Risk       No orders found for this or any previous visit.  Medium/high risk factors:   End-stage re Internal Lab or Procedure External Lab or Procedure   Diabetes Screening      HbgA1C    At Least  Annually for Diabetics HgbA1C (%)   Date Value   07/12/2018 5.9 (H)    No flowsheet data found.     Fasting Blood Sugar (FSB)   Patient must be diagnosed with found for: FOB, OCCULTSTOOL No flowsheet data found.      Barium Enema-   uncomfortable but covered  Covered but uncomfortable   Glaucoma Screening      Ophthalmology Visit   Covered annually for Diabetics, people with Glaucoma family history,  [place]os [place] concentrates   Clients of institutions for the mentally retarded   Persons who live in the same house as a HepB virus carrier   Homosexual men   Illicit injectable drug abusers     Tetanus Toxoid- Only covered with a cut with metal- TD and TDaP Not covered

## 2018-12-05 PROBLEM — F33.0 MILD RECURRENT MAJOR DEPRESSION (HCC): Chronic | Status: ACTIVE | Noted: 2018-12-05

## 2018-12-05 PROBLEM — E66.9 OBESITY (BMI 30.0-34.9): Status: ACTIVE | Noted: 2017-04-03

## 2018-12-05 PROBLEM — F33.0 MILD RECURRENT MAJOR DEPRESSION: Chronic | Status: ACTIVE | Noted: 2018-12-05

## 2018-12-05 PROBLEM — E66.811 OBESITY (BMI 30.0-34.9): Status: ACTIVE | Noted: 2017-04-03

## 2018-12-06 ENCOUNTER — TELEPHONE (OUTPATIENT)
Dept: FAMILY MEDICINE CLINIC | Facility: CLINIC | Age: 73
End: 2018-12-06

## 2018-12-06 NOTE — TELEPHONE ENCOUNTER
Colonoscopy report received from [facility] in [place], Dr Kirill Bhakta 4/7/12    [facility] updated  Report to scanning

## 2018-12-07 ENCOUNTER — NURSE ONLY (OUTPATIENT)
Dept: FAMILY MEDICINE CLINIC | Facility: CLINIC | Age: 73
End: 2018-12-07
Payer: COMMERCIAL

## 2018-12-07 DIAGNOSIS — R73.9 HYPERGLYCEMIA: ICD-10-CM

## 2018-12-07 DIAGNOSIS — Z13.6 SCREENING FOR CARDIOVASCULAR CONDITION: ICD-10-CM

## 2018-12-07 DIAGNOSIS — Q63.1 HORSESHOE KIDNEY: ICD-10-CM

## 2018-12-07 DIAGNOSIS — Z00.00 ENCOUNTER FOR ANNUAL HEALTH EXAMINATION: ICD-10-CM

## 2018-12-07 PROCEDURE — 85025 COMPLETE CBC W/AUTO DIFF WBC: CPT | Performed by: FAMILY MEDICINE

## 2018-12-07 PROCEDURE — 83036 HEMOGLOBIN GLYCOSYLATED A1C: CPT | Performed by: FAMILY MEDICINE

## 2018-12-07 PROCEDURE — 36415 COLL VENOUS BLD VENIPUNCTURE: CPT | Performed by: FAMILY MEDICINE

## 2018-12-07 PROCEDURE — 80061 LIPID PANEL: CPT | Performed by: FAMILY MEDICINE

## 2018-12-07 PROCEDURE — 80053 COMPREHEN METABOLIC PANEL: CPT | Performed by: FAMILY MEDICINE

## 2018-12-08 DIAGNOSIS — R73.9 HYPERGLYCEMIA: Primary | ICD-10-CM

## 2019-01-07 ENCOUNTER — PATIENT OUTREACH (OUTPATIENT)
Dept: FAMILY MEDICINE CLINIC | Facility: CLINIC | Age: 74
End: 2019-01-07

## 2019-01-08 ENCOUNTER — TELEPHONE (OUTPATIENT)
Dept: FAMILY MEDICINE CLINIC | Facility: CLINIC | Age: 74
End: 2019-01-08

## 2019-06-24 ENCOUNTER — OFFICE VISIT (OUTPATIENT)
Dept: FAMILY MEDICINE CLINIC | Facility: CLINIC | Age: 74
End: 2019-06-24
Payer: COMMERCIAL

## 2019-06-24 VITALS
SYSTOLIC BLOOD PRESSURE: 114 MMHG | RESPIRATION RATE: 16 BRPM | HEIGHT: 65 IN | BODY MASS INDEX: 28.82 KG/M2 | DIASTOLIC BLOOD PRESSURE: 78 MMHG | HEART RATE: 80 BPM | TEMPERATURE: 99 F | WEIGHT: 173 LBS

## 2019-06-24 DIAGNOSIS — E66.01 MORBID (SEVERE) OBESITY DUE TO EXCESS CALORIES (HCC): ICD-10-CM

## 2019-06-24 DIAGNOSIS — R73.9 HYPERGLYCEMIA: ICD-10-CM

## 2019-06-24 DIAGNOSIS — F33.0 MILD RECURRENT MAJOR DEPRESSION (HCC): ICD-10-CM

## 2019-06-24 DIAGNOSIS — E78.00 ELEVATED CHOLESTEROL: ICD-10-CM

## 2019-06-24 DIAGNOSIS — I10 ESSENTIAL HYPERTENSION: Primary | ICD-10-CM

## 2019-06-24 DIAGNOSIS — Q63.1 HORSESHOE KIDNEY: ICD-10-CM

## 2019-06-24 PROBLEM — N18.30 CKD (CHRONIC KIDNEY DISEASE) STAGE 3, GFR 30-59 ML/MIN (HCC): Chronic | Status: ACTIVE | Noted: 2019-06-24

## 2019-06-24 PROCEDURE — 80048 BASIC METABOLIC PNL TOTAL CA: CPT | Performed by: FAMILY MEDICINE

## 2019-06-24 PROCEDURE — 85025 COMPLETE CBC W/AUTO DIFF WBC: CPT | Performed by: FAMILY MEDICINE

## 2019-06-24 PROCEDURE — 80061 LIPID PANEL: CPT | Performed by: FAMILY MEDICINE

## 2019-06-24 PROCEDURE — 83036 HEMOGLOBIN GLYCOSYLATED A1C: CPT | Performed by: FAMILY MEDICINE

## 2019-06-24 PROCEDURE — 99214 OFFICE O/P EST MOD 30 MIN: CPT | Performed by: FAMILY MEDICINE

## 2019-06-24 RX ORDER — PHENTERMINE HYDROCHLORIDE 37.5 MG/1
TABLET ORAL
COMMUNITY
End: 2019-09-20 | Stop reason: ALTCHOICE

## 2019-06-24 NOTE — PROGRESS NOTES
Dex Stewart is a 69year old female. Patient presents with: Follow - Up: on medications      HPI:   Doing well. Had bariatric surgery about a year ago. Following with bariatric surgeon. Started on 1/2 tab of phentermine last month.  She is pleased with 190 lb 9.6 oz  07/16/18 : 218 lb 12.8 oz  12/06/17 : 231 lb  09/25/17 : 225 lb  04/03/17 : 226 lb 9.6 oz      REVIEW OF SYSTEMS:   GENERAL HEALTH: feels well no complaints other than above   SKIN: denies any unusual skin lesions or rashes  RESPIRATORY: den (severe) obesity due to excess calories ([address]  - losing weight with bariatric surgery and phentermine as well as diet. Feeling good.      Mild recurrent major depression ([address]  - doing well       Orders Placed This Encounter      Basic Metabolic Panel (8) [

## 2019-06-26 DIAGNOSIS — E78.00 ELEVATED CHOLESTEROL: Primary | ICD-10-CM

## 2019-06-26 DIAGNOSIS — R73.09 ELEVATED HEMOGLOBIN A1C: ICD-10-CM

## 2019-09-20 ENCOUNTER — HOSPITAL ENCOUNTER (OUTPATIENT)
Dept: GENERAL RADIOLOGY | Age: 74
Discharge: HOME | End: 2019-09-20
Attending: FAMILY MEDICINE
Payer: MEDICARE

## 2019-09-20 ENCOUNTER — OFFICE VISIT (OUTPATIENT)
Dept: FAMILY MEDICINE CLINIC | Facility: CLINIC | Age: 74
End: 2019-09-20
Payer: COMMERCIAL

## 2019-09-20 VITALS
BODY MASS INDEX: 28.16 KG/M2 | SYSTOLIC BLOOD PRESSURE: 126 MMHG | WEIGHT: 169 LBS | DIASTOLIC BLOOD PRESSURE: 76 MMHG | TEMPERATURE: 99 F | HEART RATE: 84 BPM | RESPIRATION RATE: 16 BRPM | HEIGHT: 65 IN

## 2019-09-20 DIAGNOSIS — N18.30 CKD (CHRONIC KIDNEY DISEASE) STAGE 3, GFR 30-59 ML/MIN (HCC): ICD-10-CM

## 2019-09-20 DIAGNOSIS — R42 EPISODIC LIGHTHEADEDNESS: ICD-10-CM

## 2019-09-20 DIAGNOSIS — R06.00 DOE (DYSPNEA ON EXERTION): Primary | ICD-10-CM

## 2019-09-20 DIAGNOSIS — R00.2 INTERMITTENT PALPITATIONS: ICD-10-CM

## 2019-09-20 DIAGNOSIS — Z23 NEED FOR VACCINATION: ICD-10-CM

## 2019-09-20 DIAGNOSIS — R06.00 DOE (DYSPNEA ON EXERTION): ICD-10-CM

## 2019-09-20 PROCEDURE — 71046 X-RAY EXAM CHEST 2 VIEWS: CPT | Performed by: FAMILY MEDICINE

## 2019-09-20 PROCEDURE — 93000 ELECTROCARDIOGRAM COMPLETE: CPT | Performed by: FAMILY MEDICINE

## 2019-09-20 PROCEDURE — 90662 IIV NO PRSV INCREASED AG IM: CPT | Performed by: FAMILY MEDICINE

## 2019-09-20 PROCEDURE — 99214 OFFICE O/P EST MOD 30 MIN: CPT | Performed by: FAMILY MEDICINE

## 2019-09-20 PROCEDURE — G0008 ADMIN INFLUENZA VIRUS VAC: HCPCS | Performed by: FAMILY MEDICINE

## 2019-09-20 NOTE — PROGRESS NOTES
Naveed Valladares is a 69year old female. Patient presents with:  Dizziness: felt like passing out recently      HPI:   She has had a couple episodes of feeling lightheaded. She was eating out with a friend. Heart was beating out of her chest, felt faint.  Lexi Rai Encounters:  09/20/19 : 169 lb  06/24/19 : 173 lb  12/04/18 : 190 lb 9.6 oz  07/16/18 : 218 lb 12.8 oz  12/06/17 : 231 lb  09/25/17 : 225 lb      REVIEW OF SYSTEMS:   GENERAL HEALTH: feels well no complaints other than above   SKIN: denies any unusual skin FERRITIN  -     ELECTROCARDIOGRAM, COMPLETE  -     XR CHEST PA + LAT CHEST (CPT=71046);  Future        Orders Placed This Encounter      Comp Metabolic Panel (14)      CBC With Differential With Platelet      TSH W Reflex To Free T4      Ferritin

## 2019-09-22 LAB
ABSOLUTE BASOPHILS: 50 CELLS/UL (ref 0–200)
ABSOLUTE EOSINOPHILS: 180 CELLS/UL (ref 15–500)
ABSOLUTE LYMPHOCYTES: 2534 CELLS/UL (ref 850–3900)
ABSOLUTE MONOCYTES: 461 CELLS/UL (ref 200–950)
ABSOLUTE NEUTROPHILS: 3974 CELLS/UL (ref 1500–7800)
ALBUMIN/GLOBULIN RATIO: 1.4 (CALC) (ref 1–2.5)
ALBUMIN: 3.9 G/DL (ref 3.6–5.1)
ALKALINE PHOSPHATASE: 57 U/L (ref 33–130)
ALT: 8 U/L (ref 6–29)
AST: 14 U/L (ref 10–35)
BASOPHILS: 0.7 %
BILIRUBIN, TOTAL: 0.4 MG/DL (ref 0.2–1.2)
BUN/CREATININE RATIO: 28 (CALC) (ref 6–22)
BUN: 28 MG/DL (ref 7–25)
CALCIUM: 9 MG/DL (ref 8.6–10.4)
CARBON DIOXIDE: 28 MMOL/L (ref 20–32)
CHLORIDE: 103 MMOL/L (ref 98–110)
CREATININE: 0.99 MG/DL (ref 0.6–0.93)
EGFR IF AFRICN AM: 66 ML/MIN/1.73M2
EGFR IF NONAFRICN AM: 57 ML/MIN/1.73M2
EOSINOPHILS: 2.5 %
FERRITIN: 8 NG/ML (ref 16–288)
GLOBULIN: 2.7 G/DL (CALC) (ref 1.9–3.7)
GLUCOSE: 100 MG/DL (ref 65–99)
HEMATOCRIT: 35.2 % (ref 35–45)
HEMOGLOBIN: 11.2 G/DL (ref 11.7–15.5)
LYMPHOCYTES: 35.2 %
MCH: 27 PG (ref 27–33)
MCHC: 31.8 G/DL (ref 32–36)
MCV: 84.8 FL (ref 80–100)
MONOCYTES: 6.4 %
MPV: 9.8 FL (ref 7.5–12.5)
NEUTROPHILS: 55.2 %
PLATELET COUNT: 268 THOUSAND/UL (ref 140–400)
POTASSIUM: 4.5 MMOL/L (ref 3.5–5.3)
PROTEIN, TOTAL: 6.6 G/DL (ref 6.1–8.1)
RDW: 14.1 % (ref 11–15)
RED BLOOD CELL COUNT: 4.15 MILLION/UL (ref 3.8–5.1)
SODIUM: 138 MMOL/L (ref 135–146)
TSH W/REFLEX TO FT4: 3.34 MIU/L (ref 0.4–4.5)
WHITE BLOOD CELL COUNT: 7.2 THOUSAND/UL (ref 3.8–10.8)

## 2019-09-27 ENCOUNTER — TELEPHONE (OUTPATIENT)
Dept: FAMILY MEDICINE CLINIC | Facility: CLINIC | Age: 74
End: 2019-09-27

## 2019-09-27 NOTE — TELEPHONE ENCOUNTER
Patient states she was in for episodes of heavy breathing and feeling faint. Ekg and labs ok. States states her episodes typically last 30 minutes but today it lasted about 2 hours. She called ambulance and they came. Her vitals were fine.  Patient decline

## 2019-10-01 ENCOUNTER — OFFICE VISIT (OUTPATIENT)
Dept: FAMILY MEDICINE CLINIC | Facility: CLINIC | Age: 74
End: 2019-10-01
Payer: COMMERCIAL

## 2019-10-01 VITALS
OXYGEN SATURATION: 98 % | BODY MASS INDEX: 27.66 KG/M2 | SYSTOLIC BLOOD PRESSURE: 100 MMHG | RESPIRATION RATE: 20 BRPM | WEIGHT: 166 LBS | HEIGHT: 65 IN | DIASTOLIC BLOOD PRESSURE: 68 MMHG | HEART RATE: 98 BPM | TEMPERATURE: 98 F

## 2019-10-01 DIAGNOSIS — J18.9 PNEUMONIA OF BOTH LOWER LOBES DUE TO INFECTIOUS ORGANISM: Primary | ICD-10-CM

## 2019-10-01 DIAGNOSIS — R06.00 DOE (DYSPNEA ON EXERTION): ICD-10-CM

## 2019-10-01 PROCEDURE — 99214 OFFICE O/P EST MOD 30 MIN: CPT | Performed by: FAMILY MEDICINE

## 2019-10-01 RX ORDER — LEVOFLOXACIN 500 MG/1
500 TABLET, FILM COATED ORAL
COMMUNITY
Start: 2019-09-28 | End: 2020-02-11 | Stop reason: ALTCHOICE

## 2019-10-01 NOTE — PROGRESS NOTES
Sola Oneil is a 69year old female. Patient presents with: Follow - Up: on pneumonia      HPI:   Was diagnosed with pneumonia 9/27/19. She had CXR showing b/l lower infiltrates.  She was given IV levaquin and then sent home with 7 days of it oral.   N (99.2 kg)  12/06/17 : 231 lb (104.8 kg)      REVIEW OF SYSTEMS:   GENERAL HEALTH: feels well no complaints other than above   SKIN: denies any unusual skin lesions or rashes  RESPIRATORY: denies shortness of breath with exertion  CARDIOVASCULAR: denies talya

## 2019-10-11 ENCOUNTER — HOSPITAL ENCOUNTER (OUTPATIENT)
Dept: CV DIAGNOSTICS | Age: 74
Discharge: HOME | End: 2019-10-11
Attending: FAMILY MEDICINE
Payer: MEDICARE

## 2019-10-11 DIAGNOSIS — R42 EPISODIC LIGHTHEADEDNESS: ICD-10-CM

## 2019-10-11 DIAGNOSIS — R06.00 DOE (DYSPNEA ON EXERTION): ICD-10-CM

## 2019-10-11 DIAGNOSIS — R00.2 INTERMITTENT PALPITATIONS: ICD-10-CM

## 2019-10-11 PROCEDURE — 93225 XTRNL ECG REC<48 HRS REC: CPT | Performed by: FAMILY MEDICINE

## 2019-10-11 PROCEDURE — 93227 XTRNL ECG REC<48 HR R&I: CPT | Performed by: FAMILY MEDICINE

## 2019-10-11 PROCEDURE — 93226 XTRNL ECG REC<48 HR SCAN A/R: CPT | Performed by: FAMILY MEDICINE

## 2019-10-14 DIAGNOSIS — F32.A DEPRESSION, UNSPECIFIED DEPRESSION TYPE: ICD-10-CM

## 2019-10-14 RX ORDER — CITALOPRAM 20 MG/1
TABLET ORAL
Qty: 90 TABLET | Refills: 0 | Status: SHIPPED | OUTPATIENT
Start: 2019-10-14 | End: 2020-01-13

## 2019-10-16 ENCOUNTER — HOSPITAL ENCOUNTER (OUTPATIENT)
Dept: CV DIAGNOSTICS | Age: 74
Discharge: HOME | End: 2019-10-16
Attending: FAMILY MEDICINE
Payer: MEDICARE

## 2019-10-16 DIAGNOSIS — R06.00 DOE (DYSPNEA ON EXERTION): ICD-10-CM

## 2019-10-16 DIAGNOSIS — R42 EPISODIC LIGHTHEADEDNESS: ICD-10-CM

## 2019-10-16 DIAGNOSIS — R00.2 INTERMITTENT PALPITATIONS: ICD-10-CM

## 2019-10-16 PROCEDURE — 93017 CV STRESS TEST TRACING ONLY: CPT | Performed by: FAMILY MEDICINE

## 2019-10-16 PROCEDURE — 78452 HT MUSCLE IMAGE SPECT MULT: CPT | Performed by: FAMILY MEDICINE

## 2019-10-16 PROCEDURE — 93018 CV STRESS TEST I&R ONLY: CPT | Performed by: FAMILY MEDICINE

## 2019-11-11 ENCOUNTER — MED REC SCAN ONLY (OUTPATIENT)
Dept: FAMILY MEDICINE CLINIC | Facility: CLINIC | Age: 74
End: 2019-11-11

## 2019-11-15 ENCOUNTER — TELEPHONE (OUTPATIENT)
Dept: FAMILY MEDICINE CLINIC | Facility: CLINIC | Age: 74
End: 2019-11-15

## 2019-11-15 NOTE — TELEPHONE ENCOUNTER
Received via fax today, 11/15/19, medical records request for pt's entire file to be faxed to [facility] by this afternoon. Fax was placed in Dr. Kinsey Kaye mail basket.   Received a call from David at [facility], inquiring as to where pt's file is, they had n

## 2019-11-16 NOTE — TELEPHONE ENCOUNTER
11/16/19:  Received via fax, 11/15/19, updated med request from [facility] for D/C note, Consult, H&P, EKG. Printed EKG and last H&P. Faxed to [facility] today, 11/16/19. Do not see any consults nor D/C note in system.   Made bar code to this note and s

## 2020-01-13 DIAGNOSIS — I10 ESSENTIAL HYPERTENSION: ICD-10-CM

## 2020-01-13 DIAGNOSIS — F32.A DEPRESSION, UNSPECIFIED DEPRESSION TYPE: ICD-10-CM

## 2020-01-13 RX ORDER — METOPROLOL SUCCINATE 50 MG/1
TABLET, EXTENDED RELEASE ORAL
Qty: 90 TABLET | Refills: 0 | Status: SHIPPED | OUTPATIENT
Start: 2020-01-13 | End: 2020-02-11

## 2020-01-13 RX ORDER — CITALOPRAM 20 MG/1
TABLET ORAL
Qty: 90 TABLET | Refills: 0 | Status: SHIPPED | OUTPATIENT
Start: 2020-01-13 | End: 2020-02-11

## 2020-01-13 NOTE — TELEPHONE ENCOUNTER
Hypertension Medications Protocol Passed1/13 1:25 PM   CMP or BMP in past 12 months    Last serum creatinine< 2.0    Appointment in past 6 or next 3 months

## 2020-01-13 NOTE — TELEPHONE ENCOUNTER
Spoke with patient who states she has moved to [place] but has not established with a new PCP. Advised patient a short term script can be sent but will need to establish with a new PCP for further refills. Patient verbalized understanding.       Routed t

## 2020-02-01 ENCOUNTER — TELEPHONE (OUTPATIENT)
Dept: FAMILY MEDICINE CLINIC | Facility: CLINIC | Age: 75
End: 2020-02-01

## 2020-02-01 NOTE — TELEPHONE ENCOUNTER
Pt called she is down in [place] and will be back in [place] from 2[place]10-2/14 wants to know if  could fit her in for a MA supervisit while she is in town?

## 2020-02-11 ENCOUNTER — OFFICE VISIT (OUTPATIENT)
Dept: FAMILY MEDICINE CLINIC | Facility: CLINIC | Age: 75
End: 2020-02-11
Payer: COMMERCIAL

## 2020-02-11 VITALS
SYSTOLIC BLOOD PRESSURE: 102 MMHG | BODY MASS INDEX: 27.66 KG/M2 | WEIGHT: 162 LBS | TEMPERATURE: 98 F | HEIGHT: 64 IN | HEART RATE: 64 BPM | RESPIRATION RATE: 16 BRPM | DIASTOLIC BLOOD PRESSURE: 72 MMHG

## 2020-02-11 DIAGNOSIS — N18.30 CKD (CHRONIC KIDNEY DISEASE) STAGE 3, GFR 30-59 ML/MIN (HCC): Chronic | ICD-10-CM

## 2020-02-11 DIAGNOSIS — I10 ESSENTIAL HYPERTENSION: ICD-10-CM

## 2020-02-11 DIAGNOSIS — F33.0 MILD RECURRENT MAJOR DEPRESSION (HCC): Chronic | ICD-10-CM

## 2020-02-11 DIAGNOSIS — E66.01 MORBID (SEVERE) OBESITY DUE TO EXCESS CALORIES (HCC): ICD-10-CM

## 2020-02-11 DIAGNOSIS — E11.41 TYPE 2 DIABETES MELLITUS WITH DIABETIC MONONEUROPATHY, WITHOUT LONG-TERM CURRENT USE OF INSULIN (HCC): ICD-10-CM

## 2020-02-11 DIAGNOSIS — E66.9 OBESITY (BMI 30.0-34.9): ICD-10-CM

## 2020-02-11 DIAGNOSIS — Q63.1 HORSESHOE KIDNEY: ICD-10-CM

## 2020-02-11 DIAGNOSIS — F32.A DEPRESSION, UNSPECIFIED DEPRESSION TYPE: ICD-10-CM

## 2020-02-11 DIAGNOSIS — Z00.00 ENCOUNTER FOR ANNUAL HEALTH EXAMINATION: Primary | ICD-10-CM

## 2020-02-11 DIAGNOSIS — Z12.31 VISIT FOR SCREENING MAMMOGRAM: ICD-10-CM

## 2020-02-11 DIAGNOSIS — I47.1 PAROXYSMAL SVT (SUPRAVENTRICULAR TACHYCARDIA) (HCC): ICD-10-CM

## 2020-02-11 PROBLEM — I47.10 PAROXYSMAL SVT (SUPRAVENTRICULAR TACHYCARDIA) (HCC): Status: ACTIVE | Noted: 2020-02-11

## 2020-02-11 PROBLEM — I47.10 PAROXYSMAL SVT (SUPRAVENTRICULAR TACHYCARDIA): Status: ACTIVE | Noted: 2020-02-11

## 2020-02-11 PROCEDURE — 99397 PER PM REEVAL EST PAT 65+ YR: CPT | Performed by: FAMILY MEDICINE

## 2020-02-11 PROCEDURE — G0439 PPPS, SUBSEQ VISIT: HCPCS | Performed by: FAMILY MEDICINE

## 2020-02-11 PROCEDURE — 96160 PT-FOCUSED HLTH RISK ASSMT: CPT | Performed by: FAMILY MEDICINE

## 2020-02-11 RX ORDER — FLECAINIDE ACETATE 50 MG/1
TABLET ORAL
COMMUNITY
Start: 2019-12-14

## 2020-02-11 RX ORDER — CITALOPRAM 20 MG/1
TABLET ORAL
Qty: 90 TABLET | Refills: 1 | Status: SHIPPED | OUTPATIENT
Start: 2020-02-11 | End: 2020-07-21

## 2020-02-11 RX ORDER — METOPROLOL SUCCINATE 50 MG/1
50 TABLET, EXTENDED RELEASE ORAL
Qty: 90 TABLET | Refills: 1 | Status: SHIPPED | OUTPATIENT
Start: 2020-02-11 | End: 2020-07-10

## 2020-02-11 NOTE — PATIENT INSTRUCTIONS
Angelica Johns's SCREENING SCHEDULE   Tests on this list are recommended by your physician but may not be covered, or covered at this frequency, by your insurer. Please check with your insurance carrier before scheduling to verify coverage.    PREVENTATIV Covered every 10 years- more often if abnormal Colonoscopy due on 04/07/2022 Update Health Maintenance if applicable    Flex Sigmoidoscopy Screen  Covered every 5 years No results found for this or any previous visit. No flowsheet data found.      Fecal O Pneumococcal 23 (Pneumovax)  Covered Once after 65 No orders found for this or any previous visit. Please get once after your 65th birthday    Hepatitis B for Moderate/High Risk       No orders found for this or any previous visit.  Medium/high risk factors

## 2020-02-11 NOTE — PROGRESS NOTES
HPI:   Jasmyne Murcia is a 70year old female who presents for a Medicare Subsequent Annual Wellness visit (Pt already had Initial Annual Wellness). Goes to skin doctor regularly, no cancerous findings but several things were treated.      Seeing cardi 1 tablet (50 mg total) by mouth once daily. aspirin 81 MG Oral Tab, Take 81 mg by mouth daily.        MEDICAL INFORMATION:   She  has a past medical history of Arm fracture, right (2016), Depression, Diabetes ([facility] [address] Essential hypertension, and History of a Both Eyes Visual Acuity: Corrected Both Eyes Chart Acuity: 20/25   Able To Tolerate Visual Acuity: Yes      General Appearance:  Alert, cooperative, no distress, appears stated age   Head:  Normocephalic, without obvious abnormality, atraumatic   Eyes: Yulisa Key is a 70year old female who presents for a Medicare Assessment. PLAN SUMMARY:   Diagnoses and all orders for this visit:    1. Encounter for annual health examination  Completed today.      2. Depression, unspecified depression type  Doing well understanding of these issues and agrees to the plan. Return for Wellness Visit.      Abimael Gallardo DO, 3/13/2017     General Health     In the past six months, have you lost more than 10 pounds without trying?: 2 - No    Has your appetite been poor?: No Correct    What year is it?: Correct    Recall \"Ball\": Correct    Recall \"Flag\": Correct    Recall \"Tree\": Correct       This section provided for quick review of chart, separate sheet to patient  PREVENTATIVE SERVICES  INDICATIONS AND SCHEDULE Inter Pneumoccocal 13 (Prevnar) No orders found for this or any previous visit. Pneumococcal 23 (Pneumovax) No orders found for this or any previous visit. Hepatitis B No orders found for this or any previous visit.      Tetanus Orders placed or performe

## 2020-02-15 LAB
CREATININE, RANDOM URINE: 43 MG/DL (ref 20–275)
HEMOGLOBIN A1C: 5.5 % OF TOTAL HGB
MICROALBUMIN/CREATININE RATIO, RANDOM URINE: 12 MCG/MG CREAT
MICROALBUMIN: 0.5 MG/DL

## 2020-07-10 DIAGNOSIS — I10 ESSENTIAL HYPERTENSION: ICD-10-CM

## 2020-07-10 RX ORDER — METOPROLOL SUCCINATE 50 MG/1
TABLET, EXTENDED RELEASE ORAL
Qty: 90 TABLET | Refills: 1 | Status: SHIPPED | OUTPATIENT
Start: 2020-07-10

## 2020-07-21 DIAGNOSIS — F32.A DEPRESSION, UNSPECIFIED DEPRESSION TYPE: ICD-10-CM

## 2020-07-21 RX ORDER — CITALOPRAM 20 MG/1
TABLET ORAL
Qty: 90 TABLET | Refills: 1 | Status: SHIPPED | OUTPATIENT
Start: 2020-07-21

## 2020-07-21 NOTE — TELEPHONE ENCOUNTER
Protocol: none  Last refilled 2/11/20 #90  LOV 2/11/20  No future appt with KE  Routed to PCP to advise

## 2021-03-04 ENCOUNTER — TELEPHONE (OUTPATIENT)
Dept: FAMILY MEDICINE CLINIC | Facility: CLINIC | Age: 76
End: 2021-03-04

## 2021-03-04 NOTE — TELEPHONE ENCOUNTER
Medicare Super Visit-pt states she has one scheduled with a dr down in [place], she is living there now.

## 2021-03-08 DIAGNOSIS — Z23 NEED FOR VACCINATION: ICD-10-CM

## 2022-08-19 ENCOUNTER — HOSPITAL ENCOUNTER (OUTPATIENT)
Dept: MAMMOGRAPHY | Age: 77
Discharge: HOME | End: 2022-08-19
Attending: FAMILY MEDICINE
Payer: MEDICARE

## 2022-08-19 ENCOUNTER — OFFICE VISIT (OUTPATIENT)
Dept: FAMILY MEDICINE CLINIC | Facility: CLINIC | Age: 77
End: 2022-08-19
Payer: COMMERCIAL

## 2022-08-19 VITALS
DIASTOLIC BLOOD PRESSURE: 74 MMHG | OXYGEN SATURATION: 95 % | SYSTOLIC BLOOD PRESSURE: 100 MMHG | TEMPERATURE: 98 F | HEART RATE: 68 BPM | HEIGHT: 64 IN | WEIGHT: 181 LBS | RESPIRATION RATE: 16 BRPM | BODY MASS INDEX: 30.9 KG/M2

## 2022-08-19 DIAGNOSIS — E78.5 HYPERLIPIDEMIA, UNSPECIFIED HYPERLIPIDEMIA TYPE: ICD-10-CM

## 2022-08-19 DIAGNOSIS — Z00.00 ENCOUNTER FOR ANNUAL HEALTH EXAMINATION: Primary | ICD-10-CM

## 2022-08-19 DIAGNOSIS — R41.3 MEMORY IMPAIRMENT: ICD-10-CM

## 2022-08-19 DIAGNOSIS — Z12.31 ENCOUNTER FOR SCREENING MAMMOGRAM FOR MALIGNANT NEOPLASM OF BREAST: ICD-10-CM

## 2022-08-19 DIAGNOSIS — I10 ESSENTIAL HYPERTENSION: ICD-10-CM

## 2022-08-19 DIAGNOSIS — E66.9 OBESITY (BMI 30.0-34.9): ICD-10-CM

## 2022-08-19 DIAGNOSIS — F33.0 MILD RECURRENT MAJOR DEPRESSION (HCC): ICD-10-CM

## 2022-08-19 DIAGNOSIS — F32.A DEPRESSION, UNSPECIFIED DEPRESSION TYPE: ICD-10-CM

## 2022-08-19 DIAGNOSIS — I47.1 PAROXYSMAL SVT (SUPRAVENTRICULAR TACHYCARDIA) (HCC): ICD-10-CM

## 2022-08-19 DIAGNOSIS — R73.9 HYPERGLYCEMIA: ICD-10-CM

## 2022-08-19 DIAGNOSIS — N18.30 STAGE 3 CHRONIC KIDNEY DISEASE, UNSPECIFIED WHETHER STAGE 3A OR 3B CKD (HCC): ICD-10-CM

## 2022-08-19 DIAGNOSIS — Q63.1 HORSESHOE KIDNEY: ICD-10-CM

## 2022-08-19 PROBLEM — R73.03 PREDIABETES: Status: ACTIVE | Noted: 2020-03-04

## 2022-08-19 PROCEDURE — 1126F AMNT PAIN NOTED NONE PRSNT: CPT | Performed by: FAMILY MEDICINE

## 2022-08-19 PROCEDURE — 3078F DIAST BP <80 MM HG: CPT | Performed by: FAMILY MEDICINE

## 2022-08-19 PROCEDURE — 99397 PER PM REEVAL EST PAT 65+ YR: CPT | Performed by: FAMILY MEDICINE

## 2022-08-19 PROCEDURE — 96160 PT-FOCUSED HLTH RISK ASSMT: CPT | Performed by: FAMILY MEDICINE

## 2022-08-19 PROCEDURE — 3008F BODY MASS INDEX DOCD: CPT | Performed by: FAMILY MEDICINE

## 2022-08-19 PROCEDURE — 3074F SYST BP LT 130 MM HG: CPT | Performed by: FAMILY MEDICINE

## 2022-08-19 PROCEDURE — G0439 PPPS, SUBSEQ VISIT: HCPCS | Performed by: FAMILY MEDICINE

## 2022-08-19 PROCEDURE — 77067 SCR MAMMO BI INCL CAD: CPT | Performed by: FAMILY MEDICINE

## 2022-08-19 RX ORDER — CITALOPRAM 20 MG/1
TABLET ORAL
Qty: 90 TABLET | Refills: 1 | Status: SHIPPED | OUTPATIENT
Start: 2022-08-19

## 2022-08-19 RX ORDER — LEVOCETIRIZINE DIHYDROCHLORIDE 5 MG/1
TABLET, FILM COATED ORAL
COMMUNITY

## 2022-08-19 RX ORDER — DONEPEZIL HYDROCHLORIDE 10 MG/1
TABLET, FILM COATED ORAL
COMMUNITY
Start: 2021-02-21 | End: 2022-08-19

## 2022-08-19 RX ORDER — IPRATROPIUM BROMIDE 42 UG/1
2 SPRAY, METERED NASAL 3 TIMES DAILY
COMMUNITY
Start: 2022-05-24

## 2022-08-19 RX ORDER — DONEPEZIL HYDROCHLORIDE 10 MG/1
10 TABLET, FILM COATED ORAL NIGHTLY
Qty: 90 TABLET | Refills: 3 | Status: SHIPPED | OUTPATIENT
Start: 2022-08-19

## 2022-08-19 RX ORDER — METOPROLOL SUCCINATE 50 MG/1
50 TABLET, EXTENDED RELEASE ORAL DAILY
Qty: 90 TABLET | Refills: 1 | Status: SHIPPED | OUTPATIENT
Start: 2022-08-19

## 2022-09-20 ENCOUNTER — TELEPHONE (OUTPATIENT)
Dept: FAMILY MEDICINE CLINIC | Facility: CLINIC | Age: 77
End: 2022-09-20

## 2022-09-20 NOTE — TELEPHONE ENCOUNTER
Letter mailed to patient reminding her she has outstanding orders.     Order Code Tests Ordered (Total: 3)    Order Code Tests Ordered      4829 CBC WITH DIFFERENTIAL WITH PLATELET   83027 COMP METABOLIC PANEL (14)    8707 LIPID PANEL

## 2022-11-28 ENCOUNTER — TELEPHONE (OUTPATIENT)
Dept: FAMILY MEDICINE CLINIC | Facility: CLINIC | Age: 77
End: 2022-11-28

## 2022-11-28 DIAGNOSIS — Z12.11 SCREENING FOR COLON CANCER: Primary | ICD-10-CM

## 2022-11-28 NOTE — TELEPHONE ENCOUNTER
I put in a referral.   She can call, but it is unlikely they can get her in that quickly. She might need to plan for another trip next year. no

## 2022-11-28 NOTE — TELEPHONE ENCOUNTER
Patient states she will be in town all of December and was wondering about getting colonoscopy while here    Please adv

## 2023-01-12 ENCOUNTER — TELEPHONE (OUTPATIENT)
Dept: FAMILY MEDICINE CLINIC | Facility: CLINIC | Age: 78
End: 2023-01-12

## 2023-01-12 DIAGNOSIS — Z12.11 SCREENING FOR COLON CANCER: Primary | ICD-10-CM

## 2023-01-12 NOTE — TELEPHONE ENCOUNTER
Referral entered in the system- need to confrm that insurance is still the same PPO and not HMO    As long as PPO no specific referral is needed, but an order- it is placed and visible under the other orders report

## 2023-01-12 NOTE — TELEPHONE ENCOUNTER
Pt is getting her colonoscopy completed in [place] where she lives part-time.   Needs a referral to:    Dr. Mandie Buck, gastroenterologist, [place], [place]    Fax:  [phone]

## 2023-01-13 NOTE — TELEPHONE ENCOUNTER
Spoke with patient who states her insurance changed after the first of the year. She has a PPO    Call transferred to  to update insurance information.     Order faxed

## 2023-01-26 NOTE — TELEPHONE ENCOUNTER
Mammogram done 1/4/18 at [facility]  Additional views done at [facility] came back benign.    Annual screening mammogram for f/u    Patient notified via detailed voicemail left at cell number (ok per  HIPAA consent)    Health maintenance updated  Report to scanning No

## 2023-08-17 DIAGNOSIS — F32.A DEPRESSION, UNSPECIFIED DEPRESSION TYPE: ICD-10-CM

## 2023-08-17 RX ORDER — CITALOPRAM 20 MG/1
TABLET ORAL
Qty: 90 TABLET | Refills: 1 | Status: SHIPPED | OUTPATIENT
Start: 2023-08-17

## 2023-08-17 NOTE — TELEPHONE ENCOUNTER
LOV 08/19/22  Last refill on 08/19/22, for #90 tabs, with 1 refills  citalopram 20 MG Oral Tab     No future appointments. Order(s) pending, please review. Thank you.

## 2023-10-09 ENCOUNTER — TELEPHONE (OUTPATIENT)
Dept: FAMILY MEDICINE CLINIC | Facility: CLINIC | Age: 78
End: 2023-10-09

## 2023-10-09 DIAGNOSIS — Z12.31 BREAST CANCER SCREENING BY MAMMOGRAM: Primary | ICD-10-CM

## 2023-10-09 NOTE — TELEPHONE ENCOUNTER
Per 8/19/22 [facility] result note:  Emory Mandujano, DO  8/29/2022 11:49 AM CDT       Pls let pt know that after comparison to old mammograms, this one shows no changes. Nothing to worry about. We can check again in a year.

## 2023-10-09 NOTE — TELEPHONE ENCOUNTER
Pt would like an order placed for a mammogram so she can schedule appt before she goes back to [place] in 2 weeks. Thank you!

## 2023-10-10 ENCOUNTER — HOSPITAL ENCOUNTER (OUTPATIENT)
Dept: MAMMOGRAPHY | Age: 78
Discharge: HOME | End: 2023-10-10
Attending: FAMILY MEDICINE
Payer: MEDICARE

## 2023-10-10 DIAGNOSIS — Z12.31 BREAST CANCER SCREENING BY MAMMOGRAM: ICD-10-CM

## 2023-10-10 PROCEDURE — 77067 SCR MAMMO BI INCL CAD: CPT | Performed by: FAMILY MEDICINE

## 2023-10-10 PROCEDURE — 77063 BREAST TOMOSYNTHESIS BI: CPT | Performed by: FAMILY MEDICINE

## 2023-12-14 ENCOUNTER — OFFICE VISIT (OUTPATIENT)
Dept: FAMILY MEDICINE CLINIC | Facility: CLINIC | Age: 78
End: 2023-12-14
Payer: MEDICARE

## 2023-12-14 VITALS
TEMPERATURE: 99 F | OXYGEN SATURATION: 99 % | BODY MASS INDEX: 31 KG/M2 | SYSTOLIC BLOOD PRESSURE: 120 MMHG | WEIGHT: 181.38 LBS | DIASTOLIC BLOOD PRESSURE: 70 MMHG | HEART RATE: 87 BPM | RESPIRATION RATE: 18 BRPM

## 2023-12-14 DIAGNOSIS — F33.0 MILD RECURRENT MAJOR DEPRESSION (HCC): Chronic | ICD-10-CM

## 2023-12-14 DIAGNOSIS — E78.5 HYPERLIPIDEMIA, UNSPECIFIED HYPERLIPIDEMIA TYPE: ICD-10-CM

## 2023-12-14 DIAGNOSIS — R41.3 MEMORY LOSS OR IMPAIRMENT: ICD-10-CM

## 2023-12-14 DIAGNOSIS — I10 ESSENTIAL HYPERTENSION: Primary | ICD-10-CM

## 2023-12-14 PROBLEM — F32.2 MAJOR DEPRESSIVE DISORDER, SEVERE (HCC): Status: ACTIVE | Noted: 2018-12-05

## 2023-12-14 PROCEDURE — 1159F MED LIST DOCD IN RCRD: CPT | Performed by: FAMILY MEDICINE

## 2023-12-14 PROCEDURE — 3078F DIAST BP <80 MM HG: CPT | Performed by: FAMILY MEDICINE

## 2023-12-14 PROCEDURE — 3074F SYST BP LT 130 MM HG: CPT | Performed by: FAMILY MEDICINE

## 2023-12-14 PROCEDURE — 99214 OFFICE O/P EST MOD 30 MIN: CPT | Performed by: FAMILY MEDICINE

## 2023-12-14 RX ORDER — METOPROLOL SUCCINATE 25 MG/1
25 TABLET, EXTENDED RELEASE ORAL DAILY
COMMUNITY
Start: 2023-11-24

## 2023-12-14 RX ORDER — AMINOLEVULINIC ACID HYDROCHLORIDE 20 %
KIT TOPICAL
COMMUNITY
Start: 2022-10-21

## 2023-12-14 RX ORDER — BUPROPION HYDROCHLORIDE 150 MG/1
TABLET, FILM COATED, EXTENDED RELEASE ORAL
COMMUNITY

## 2024-01-15 ENCOUNTER — MED REC SCAN ONLY (OUTPATIENT)
Dept: FAMILY MEDICINE CLINIC | Facility: CLINIC | Age: 79
End: 2024-01-15

## 2024-07-01 ENCOUNTER — OFFICE VISIT (OUTPATIENT)
Dept: FAMILY MEDICINE CLINIC | Facility: CLINIC | Age: 79
End: 2024-07-01
Payer: MEDICARE

## 2024-07-01 VITALS
RESPIRATION RATE: 18 BRPM | HEART RATE: 83 BPM | DIASTOLIC BLOOD PRESSURE: 80 MMHG | TEMPERATURE: 98 F | BODY MASS INDEX: 30.32 KG/M2 | SYSTOLIC BLOOD PRESSURE: 136 MMHG | HEIGHT: 64 IN | OXYGEN SATURATION: 98 % | WEIGHT: 177.63 LBS

## 2024-07-01 DIAGNOSIS — Q63.1 HORSESHOE KIDNEY: ICD-10-CM

## 2024-07-01 DIAGNOSIS — I47.10 PAROXYSMAL SVT (SUPRAVENTRICULAR TACHYCARDIA) (HCC): ICD-10-CM

## 2024-07-01 DIAGNOSIS — F33.0 MILD RECURRENT MAJOR DEPRESSION (HCC): Chronic | ICD-10-CM

## 2024-07-01 DIAGNOSIS — Z98.84 LAP-BAND SURGERY STATUS: ICD-10-CM

## 2024-07-01 DIAGNOSIS — R41.3 MEMORY LOSS OR IMPAIRMENT: ICD-10-CM

## 2024-07-01 DIAGNOSIS — Z00.00 ENCOUNTER FOR ANNUAL HEALTH EXAMINATION: Primary | ICD-10-CM

## 2024-07-01 DIAGNOSIS — Z12.31 ENCOUNTER FOR SCREENING MAMMOGRAM FOR MALIGNANT NEOPLASM OF BREAST: ICD-10-CM

## 2024-07-01 DIAGNOSIS — E66.9 OBESITY (BMI 30.0-34.9): ICD-10-CM

## 2024-07-01 DIAGNOSIS — R73.9 HYPERGLYCEMIA: ICD-10-CM

## 2024-07-01 DIAGNOSIS — R26.81 UNSTEADINESS ON FEET: ICD-10-CM

## 2024-07-01 DIAGNOSIS — R73.03 PREDIABETES: ICD-10-CM

## 2024-07-01 DIAGNOSIS — E78.5 HYPERLIPIDEMIA, UNSPECIFIED HYPERLIPIDEMIA TYPE: ICD-10-CM

## 2024-07-01 DIAGNOSIS — N18.31 STAGE 3A CHRONIC KIDNEY DISEASE (HCC): ICD-10-CM

## 2024-07-01 DIAGNOSIS — I10 ESSENTIAL HYPERTENSION: ICD-10-CM

## 2024-07-01 PROBLEM — E66.01 MORBID (SEVERE) OBESITY DUE TO EXCESS CALORIES (HCC): Status: RESOLVED | Noted: 2019-06-24 | Resolved: 2024-07-01

## 2024-07-01 PROBLEM — F32.2 MAJOR DEPRESSIVE DISORDER, SEVERE (HCC): Status: RESOLVED | Noted: 2018-12-05 | Resolved: 2024-07-01

## 2024-07-01 LAB
ALBUMIN SERPL-MCNC: 4 G/DL (ref 3.4–5)
ALBUMIN/GLOB SERPL: 1.1 {RATIO} (ref 1–2)
ALP LIVER SERPL-CCNC: 77 U/L
ALT SERPL-CCNC: 23 U/L
ANION GAP SERPL CALC-SCNC: 7 MMOL/L (ref 0–18)
AST SERPL-CCNC: 19 U/L (ref 15–37)
BILIRUB SERPL-MCNC: 0.4 MG/DL (ref 0.1–2)
BUN BLD-MCNC: 37 MG/DL (ref 9–23)
CALCIUM BLD-MCNC: 9.8 MG/DL (ref 8.5–10.1)
CHLORIDE SERPL-SCNC: 108 MMOL/L (ref 98–112)
CHOLEST SERPL-MCNC: 232 MG/DL (ref ?–200)
CO2 SERPL-SCNC: 25 MMOL/L (ref 21–32)
CREAT BLD-MCNC: 1.47 MG/DL
EGFRCR SERPLBLD CKD-EPI 2021: 37 ML/MIN/1.73M2 (ref 60–?)
EST. AVERAGE GLUCOSE BLD GHB EST-MCNC: 123 MG/DL (ref 68–126)
FASTING PATIENT LIPID ANSWER: YES
FASTING STATUS PATIENT QL REPORTED: YES
GLOBULIN PLAS-MCNC: 3.7 G/DL (ref 2.8–4.4)
GLUCOSE BLD-MCNC: 121 MG/DL (ref 70–99)
HBA1C MFR BLD: 5.9 % (ref ?–5.7)
HDLC SERPL-MCNC: 54 MG/DL (ref 40–59)
LDLC SERPL CALC-MCNC: 152 MG/DL (ref ?–100)
NONHDLC SERPL-MCNC: 178 MG/DL (ref ?–130)
OSMOLALITY SERPL CALC.SUM OF ELEC: 300 MOSM/KG (ref 275–295)
POTASSIUM SERPL-SCNC: 4.8 MMOL/L (ref 3.5–5.1)
PROT SERPL-MCNC: 7.7 G/DL (ref 6.4–8.2)
SODIUM SERPL-SCNC: 140 MMOL/L (ref 136–145)
TRIGL SERPL-MCNC: 144 MG/DL (ref 30–149)
TSI SER-ACNC: 3.05 MIU/ML (ref 0.36–3.74)
VLDLC SERPL CALC-MCNC: 28 MG/DL (ref 0–30)

## 2024-07-01 PROCEDURE — 80053 COMPREHEN METABOLIC PANEL: CPT | Performed by: FAMILY MEDICINE

## 2024-07-01 PROCEDURE — 84443 ASSAY THYROID STIM HORMONE: CPT | Performed by: FAMILY MEDICINE

## 2024-07-01 PROCEDURE — 99499 UNLISTED E&M SERVICE: CPT | Performed by: FAMILY MEDICINE

## 2024-07-01 PROCEDURE — 83036 HEMOGLOBIN GLYCOSYLATED A1C: CPT | Performed by: FAMILY MEDICINE

## 2024-07-01 PROCEDURE — 80061 LIPID PANEL: CPT | Performed by: FAMILY MEDICINE

## 2024-07-01 PROCEDURE — 85025 COMPLETE CBC W/AUTO DIFF WBC: CPT | Performed by: FAMILY MEDICINE

## 2024-07-01 PROCEDURE — 96160 PT-FOCUSED HLTH RISK ASSMT: CPT | Performed by: FAMILY MEDICINE

## 2024-07-01 PROCEDURE — G0439 PPPS, SUBSEQ VISIT: HCPCS | Performed by: FAMILY MEDICINE

## 2024-07-01 RX ORDER — BUPROPION HYDROCHLORIDE 150 MG/1
150 TABLET, FILM COATED, EXTENDED RELEASE ORAL 2 TIMES DAILY
Qty: 180 TABLET | Refills: 0 | Status: SHIPPED | OUTPATIENT
Start: 2024-07-01

## 2024-07-01 RX ORDER — FLECAINIDE ACETATE 50 MG/1
50 TABLET ORAL EVERY 12 HOURS
Qty: 90 TABLET | Refills: 0 | Status: SHIPPED | OUTPATIENT
Start: 2024-07-01

## 2024-07-01 RX ORDER — DONEPEZIL HYDROCHLORIDE 10 MG/1
10 TABLET, FILM COATED ORAL NIGHTLY
Qty: 90 TABLET | Refills: 3 | Status: SHIPPED | OUTPATIENT
Start: 2024-07-01

## 2024-07-01 RX ORDER — METOPROLOL SUCCINATE 25 MG/1
25 TABLET, EXTENDED RELEASE ORAL DAILY
Qty: 90 TABLET | Refills: 0 | Status: SHIPPED | OUTPATIENT
Start: 2024-07-01

## 2024-07-01 NOTE — PROGRESS NOTES
HPI:   Lb Johns is a 79 year old female who presents for a Medicare Subsequent Annual Wellness visit (Pt already had Initial Annual Wellness).    Having lap band issues. She over-eats or is not mindful of eating. Will be out for 4 days.     Best friend in [place] . She sold her place down there and is up here permanently, where her family is.     She is unsteady on feet and off balance. Balance has been bad x 5 yrs, but for the past 3 months, it's gotten worse.     Goes to skin doctor regularly, no cancerous findings but several things were treated.     SVT: was seeing cardiology in [facility]. Needs a referral for cardiology up in [place].   About a year ago had heart testing that was all normal.     Weight loss: doing well, feeling well.        Colonoscopy: up to date.   Mammogram: up to date.   Labs: due   Imm: had pneumonia series and shingles, up to date.   Eye appointment was recent. Has a wrinkle on retina they are watching.     Patient Care Team: Patient Care Team:  Caro Montez DO as PCP - General (Family Medicine)       Depression - moods have been okay lately, on bupropion.      Essential hypertension - good.      Horseshoe kidney - stable.     Last Cholesterol Labs:   Lab Results   Component Value Date    CHOLEST 232 (H) 2024    HDL 54 2024     (H) 2024    TRIG 144 2024          Last Chemistry Labs:   Lab Results   Component Value Date    AST 19 2024    ALT 23 2024    CA 9.8 2024    ALB 4.0 2024    TSH 3.050 2024    CREATSERUM 1.47 (H) 2024     (H) 2024        CBC  (most recent labs)   Lab Results   Component Value Date    WBC 7.2 2019    HGB 11.2 (L) 2019     2019        ALLERGIES:   She is allergic to betadine [povidone iodine] and nifedical xl [nifedipine].    CURRENT MEDICATIONS:   Outpatient Medications Marked as Taking for the 24 encounter (Office Visit) with Caro Montez DO    Medication Sig    buPROPion HCl ER, Smoking Det, 150 MG Oral Tablet 12 Hr Take 1 tablet (150 mg total) by mouth 2 (two) times daily.    donepezil 10 MG Oral Tab Take 1 tablet (10 mg total) by mouth nightly.    flecainide 50 MG Oral Tab Take 1 tablet (50 mg total) by mouth Q12H.    metoprolol succinate ER 25 MG Oral Tablet 24 Hr Take 1 tablet (25 mg total) by mouth daily.    Ferrous Gluconate 239 (27 Fe) MG Oral Tab As Directed.    MULTIPLE VITAMINS ESSENTIAL OR 1 Dose.    aspirin 81 MG Oral Tab Take 1 tablet (81 mg total) by mouth daily.      MEDICAL INFORMATION:   She  has a past medical history of Arm fracture, right (), Depression, Essential hypertension, and History of arm fracture (2016).    She  has a past surgical history that includes tubal ligation and .    Her family history includes Breast Cancer (age of onset: 55) in her maternal cousin female; Breast Cancer (age of onset: 80) in her maternal aunt; Breast Cancer (age of onset: 90) in her mother; Diabetes in her father and mother; Heart Disorder in her father; Other in her mother; Psychiatric in her daughter.   SOCIAL HISTORY:   She  reports that she has never smoked. She has never used smokeless tobacco. She reports current alcohol use. She reports that she does not use drugs.     REVIEW OF SYSTEMS:   GENERAL: feels well otherwise, no fevers.   SKIN: denies any unusual skin lesions  EYES: denies blurred vision or double vision   HEENT: denies nasal congestion, sinus pain or ST  LUNGS: denies shortness of breath with exertion  CARDIOVASCULAR: denies chest pain on exertion  GI: denies abdominal pain, denies heartburn  : denies dysuria, vaginal discharge or itching, no complaint of urinary incontinence    MUSCULOSKELETAL: denies back pain or significant joint pain   NEURO: denies headaches,   PSYCHE: + as above   HEMATOLOGIC: denies hx of anemia  ENDOCRINE: denies thyroid history  ALL/ASTHMA: denies hx of allergy or asthma    EXAM:   BP  136/80 (BP Location: Left arm, Patient Position: Sitting, Cuff Size: adult)   Pulse 83   Temp 98.3 °F (36.8 °C) (Temporal)   Resp 18   Ht 5' 4\" (1.626 m)   Wt 177 lb 9.6 oz (80.6 kg)   SpO2 98%   BMI 30.48 kg/m²  Estimated body mass index is 30.48 kg/m² as calculated from the following:    Height as of this encounter: 5' 4\" (1.626 m).    Weight as of this encounter: 177 lb 9.6 oz (80.6 kg).    Medicare Hearing Assessment  (Required for AWV/SWV)    Questionnaire     Visual Acuity - following with eye doctor, s/p cataract surgery, last seen in September.   Right Eye Visual Acuity: Corrected Right Eye Chart Acuity: 20/30   Left Eye Visual Acuity: Corrected Left Eye Chart Acuity: 20/70   Both Eyes Visual Acuity: Corrected Both Eyes Chart Acuity: 20/30          General Appearance:  Alert, cooperative, no distress, appears stated age   Head:  Normocephalic, without obvious abnormality, atraumatic   Eyes:  PERRL, conjunctiva/corneas clear, EOM's intact both eyes   Ears:  Normal TM's and external ear canals, both ears   Nose: Nares normal, septum midline,mucosa normal, no drainage or sinus tenderness   Throat: Lips, mucosa, and tongue normal; teeth and gums normal   Neck: Supple, symmetrical, trachea midline, no adenopathy;  thyroid: not enlarged, symmetric, no tenderness/mass/nodules;     Back:   Symmetric, no curvature, ROM normal, no CVA tenderness   Lungs:   Clear to auscultation bilaterally, respirations unlabored   Heart:  Regular rate and rhythm, S1 and S2 normal, no murmur, rub, or gallop   Abdomen:   Soft, non-tender, bowel sounds active all four quadrants,  no masses, no organomegaly   Pelvic: Deferred   Extremities: Extremities normal, atraumatic, no cyanosis or edema   Pulses: 2+ and symmetric   Skin: Skin color, texture, turgor normal, many skin lesions, scars and moles all over body including on face where a lesion was recently removed.    Lymph nodes: Cervical, supraclavicular, nodes normal    Neurologic: Normal         SUGGESTED VACCINATIONS - Influenza, Pneumococcal, Zoster, Tetanus     Immunization History   Administered Date(s) Administered    Influenza 10/01/2007, 10/08/2008, 09/01/2012    Pneumococcal (Prevnar 13) 04/07/2014    Td, Preserv Free 06/23/2016    Zoster (Shingles) 09/04/2008       ASSESSMENT AND OTHER RELEVANT CHRONIC CONDITIONS:   Lb Johns is a 79 year old female who presents for a Medicare Assessment.     PLAN SUMMARY:   Diagnoses and all orders for this visit:  1. Encounter for annual health examination  Completed today.     2. Encounter for screening mammogram for malignant neoplasm of breast  [facility] order placed for when she is due.   - [facility] JESUS 2D+3D SCREENING BILAT (CPT=77067/59069); Future    3. Mild recurrent major depression (HCC)  Doing well with current meds.     4. Stage 3a chronic kidney disease (HCC)  Will monitor for now. Use caution with NSAIDs and other meds.     5. Paroxysmal SVT (supraventricular tachycardia) (HCC)  Continue current meds, refer her to cardiology [place] in [place], since no longer seeing her [place] doctors.   - flecainide 50 MG Oral Tab; Take 1 tablet (50 mg total) by mouth Q12H.  Dispense: 90 tablet; Refill: 0  - metoprolol succinate ER 25 MG Oral Tablet 24 Hr; Take 1 tablet (25 mg total) by mouth daily.  Dispense: 90 tablet; Refill: 0  - Cardio Referral - Internal    6. LAP-BAND surgery status  Refer for eval of her lap band given issues she's had lately.   - SURGICAL BARIATRICS - INTERNAL    7. Unsteadiness on feet  Refer to PT to help with strength and balance.   - Physical Therapy Referral - [facility]  - TSH W Reflex To Free T4    8. Essential hypertension  Continue current meds, see cardiology.   - Cardio Referral - Internal    9. Hyperlipidemia, unspecified hyperlipidemia type  Check labs.   - Cardio Referral - Internal  - Comp Metabolic Panel (14)  - CBC With Differential With Platelet  - Lipid Panel  - TSH W Reflex To Free T4  -  VENIPUNCTURE    10. Hyperglycemia  Recheck A1c.   - Hemoglobin A1C    11. Horseshoe kidney  Noted.     12. Obesity (BMI 30.0-34.9)  Doing well, stable.     13. Memory loss or impairment  On donepezil, will continue that.     14. Prediabetes  Noted, recheck A1c.     Ms. Johns already takes aspirin and has it on her medication list.     Diet assessment: fair     Advanced Directive:  Living Will on file in [facility]?  Lb Johns does not have a Living Will on file in [facility]. Discussed with patient and provided information      Healthcare Power of  on file in [facility]:    Lb Johns does not have a Power of  for Health Care on file in [facility]. Discussed with patient and provided information          PLAN:  The patient indicates understanding of these issues and agrees to the plan.    Return for Wellness Visit.     Caro Montez DO, 3/13/2017     General Health     In the past six months, have you lost more than 10 pounds without trying?: 2 - No    Has your appetite been poor?: No    How does the patient maintain a good energy level?: Daily Walks    How would you describe your daily physical activity?: Light    How would you describe your current health state?: Fair    How do you maintain positive mental well-being?: Social Interaction;Games;Visiting Friends;Visiting Family         Have you had any immunizations at another office such as Influenza, Hepatitis B, Tetanus, or Pneumococcal?: Yes     Functional Ability     Bathing or Showering: Able without help    Toileting: Able without help    Dressing: Able without help    Eating: Able without help    Driving: Able without help    Preparing your meals: Able without help    Managing money/bills: Able without help    Taking medications as prescribed: Able without help    Are you able to afford your medications?: No          Functional Status          Vision Problems? : Yes    Difficulty walking?: No    Difficulty dressing or bathing?: No    Problems with daily  activities? : No           Fall/Risk Assessment                                                              Depression Screening (PHQ-2/PHQ-9): Over the LAST 2 WEEKS                      Advance Directives     Do you have a healthcare power of ?: Yes    Do you have a living will?: Yes     Please go to \"Cognitive Assessment\" under Medicare Assessment section in Charting, test patient and document.    Then, refresh your progress note to see your input here.  Cognitive Assessment     What day of the week is this?: Correct    What month is it?: Correct    What year is it?: Correct    Recall \"Ball\": Correct    Recall \"Flag\": Correct    Recall \"Tree\": Correct       This section provided for quick review of chart, separate sheet to patient  PREVENTATIVE SERVICES  INDICATIONS AND SCHEDULE Internal Lab or Procedure External Lab or Procedure   Diabetes Screening      HbgA1C   Annually HEMOGLOBIN A1c (% of total Hgb)   Date Value   02/14/2020 5.5     HgbA1C (%)   Date Value   07/01/2024 5.9 (H)         No data to display                Fasting Blood Sugar (FSB)Annually Glucose (mg/dL)   Date Value   07/01/2024 121 (H)     GLUCOSE (mg/dL)   Date Value   09/21/2019 100 (H)          Cardiovascular Disease Screening     LDL Annually LDL Cholesterol (mg/dL)   Date Value   07/01/2024 152 (H)        EKG - w/ Initial Preventative Physical Exam only, or if medically necessary Electrocardiogram date       Colorectal Cancer Screening      Colonoscopy Screen every 10 years Health Maintenance   Topic Date Due    Colorectal Cancer Screening  Discontinued    Update Health Maintenance if applicable    Flex Sigmoidoscopy Screen every 10 years No results found for this or any previous visit.      No data to display                 Fecal Occult Blood Annually No results found for: \"FOB\"      No data to display                Glaucoma Screening      Ophthalmology Visit Annually: Diabetics, FHx Glaucoma, AA>50, > 65      No data  to display                Bone Density Screening      Dexascan Every two years No results found for this or any previous visit.        No data to display                Pap and Pelvic      Pap: Every 3 yrs age 21-65 or Pap+HPV every 5 yrs age 30-65, age 65 and older at high risk No recommendations at this time Update Health Maintenance if applicable    Chlamydia  Annually if high risk No results found for: \"CHLAMYDIA\"      No data to display                Screening Mammogram      Mammogram Annually to 75, then as discussed Health Maintenance   Topic Date Due    Mammogram  Discontinued    Update Health Maintenance if applicable     Immunizations      Influenza Orders placed or performed in visit on 09/20/19    Fluzone High Dose 65 yr and up [88396]    Update Immunization Activity if applicable    Pneumoccocal 13 (Prevnar) No orders found for this or any previous visit.      Pneumococcal 23 (Pneumovax) No orders found for this or any previous visit.     Hepatitis B No orders found for this or any previous visit.     Tetanus Orders placed or performed in visit on 06/23/16    Td (Tenivac) (28856) (DX V06.5/Z23)            SPECIFIC DISEASE MONITORING Internal Lab or Procedure External Lab or Procedure   Annual Monitoring of Persistent     Medications (ACE/ARB, digoxin diuretics, anticonvulsants.)    Potassium  Annually Potassium (mmol/L)   Date Value   07/01/2024 4.8     POTASSIUM (mmol/L)   Date Value   09/21/2019 4.5         No data to display                Creatinine  Annually CREATININE (mg/dL)   Date Value   09/21/2019 0.99 (H)     Creatinine (mg/dL)   Date Value   07/01/2024 1.47 (H)         No data to display                BUN  Annually BUN (mg/dL)   Date Value   07/01/2024 37 (H)     UREA NITROGEN (BUN) (mg/dL)   Date Value   09/21/2019 28 (H)         No data to display                 Drug Serum Conc  Annually No results found for: \"DIGOXIN\", \"DIG\", \"VALP\"      No data to display                Diabetes       HgbA1C  Annually HEMOGLOBIN A1c (% of total Hgb)   Date Value   02/14/2020 5.5     HgbA1C (%)   Date Value   07/01/2024 5.9 (H)         No data to display                Creat/alb ratio  Annually      LDL  Annually LDL Cholesterol (mg/dL)   Date Value   07/01/2024 152 (H)         No data to display                 Dilated Eye exam  Annually      No data to display                   No data to display                COPD      Spirometry Testing Annually Spirometry date:       No data to display                     Template: TOM RUTLEDGE MEDICARE ANNUAL ASSESSMENT FEMALE [19018]     01-May-1968

## 2024-07-01 NOTE — PATIENT INSTRUCTIONS
Lb Johns's SCREENING SCHEDULE   Tests on this list are recommended by your physician but may not be covered, or covered at this frequency, by your insurer.   Please check with your insurance carrier before scheduling to verify coverage.   PREVENTATIVE SERVICES FREQUENCY &  COVERAGE DETAILS LAST COMPLETION DATE   Diabetes Screening    Fasting Blood Sugar /  Glucose    One screening every 12 months if never tested or if previously tested but not diagnosed with pre-diabetes   One screening every 6 months if diagnosed with pre-diabetes Lab Results   Component Value Date     (H) 09/21/2019        Cardiovascular Disease Screening    Lipid Panel  Cholesterol  Lipoprotein (HDL)  Triglycerides Covered every 5 years for all Medicare beneficiaries without apparent signs or symptoms of cardiovascular disease Lab Results   Component Value Date    CHOLEST 231 (H) 06/24/2019    HDL 55 06/24/2019     (H) 06/24/2019    TRIG 127 06/24/2019         Electrocardiogram (EKG)   Covered if needed at Welcome to Medicare, and non-screening if indicated for medical reasons 09/25/2019      Ultrasound Screening for Abdominal Aortic Aneurysm (AAA) Covered once in a lifetime for one of the following risk factors   • Men who are 65-75 years old and have ever smoked   • Anyone with a family history -     Colorectal Cancer Screening  Covered for ages 50-85; only need ONE of the following:    Colonoscopy   Covered every 10 years    Covered every 2 years if patient is at high risk or previous colonoscopy was abnormal 11/30/2023    Health Maintenance   Topic Date Due   • Colorectal Cancer Screening  Discontinued       Flexible Sigmoidoscopy   Covered every 4 years -    Fecal Occult Blood Test Covered annually -   Bone Density Screening    Bone density screening    Covered every 2 years after age 65 if diagnosed with risk of osteoporosis or estrogen deficiency.    Covered yearly for long-term glucocorticoid medication use (Steroids)  No results found for this or any previous visit.      Health Maintenance Due   Topic Date Due   • DEXA Scan  Never done      Pap and Pelvic    Pap   Covered every 2 years for women at normal risk; Annually if at high risk -  No recommendations at this time    Chlamydia Annually if high risk -  No recommendations at this time   Screening Mammogram    Mammogram     Recommend annually for all female patients aged 40 and older    One baseline mammogram covered for patients aged 35-39 10/10/2023    Health Maintenance   Topic Date Due   • Mammogram  Discontinued       Immunizations    Influenza Covered once per flu season  Please get every year 09/26/2023  No recommendations at this time    Pneumococcal Each vaccine ([id number] & [id number]) covered once after 65 Prevnar 13: 04/07/2014    [id number]: 04/07/2014     No recommendations at this time    Hepatitis B One screening covered for patients with certain risk factors   -  No recommendations at this time    Tetanus Toxoid Not covered by Medicare Part B unless medically necessary (cut with metal); may be covered with your pharmacy prescription benefits 06/23/2016    Tetanus, Diptheria and Pertusis TD and TDaP Not covered by Medicare Part B -  No recommendations at this time    Zoster Not covered by Medicare Part B; may be covered with your pharmacy  prescription benefits 09/04/2008  No recommendations at this time

## 2024-07-02 DIAGNOSIS — N28.9 FUNCTION KIDNEY DECREASED: Primary | ICD-10-CM

## 2024-07-02 LAB
BASOPHILS # BLD AUTO: 0.05 X10(3) UL (ref 0–0.2)
BASOPHILS NFR BLD AUTO: 0.6 %
EOSINOPHIL # BLD AUTO: 0.11 X10(3) UL (ref 0–0.7)
EOSINOPHIL NFR BLD AUTO: 1.3 %
ERYTHROCYTE [DISTWIDTH] IN BLOOD BY AUTOMATED COUNT: 12.2 %
HCT VFR BLD AUTO: 45.9 %
HGB BLD-MCNC: 15.1 G/DL
IMM GRANULOCYTES # BLD AUTO: 0.02 X10(3) UL (ref 0–1)
IMM GRANULOCYTES NFR BLD: 0.2 %
LYMPHOCYTES # BLD AUTO: 1.27 X10(3) UL (ref 1–4)
LYMPHOCYTES NFR BLD AUTO: 15.5 %
MCH RBC QN AUTO: 30.8 PG (ref 26–34)
MCHC RBC AUTO-ENTMCNC: 32.9 G/DL (ref 31–37)
MCV RBC AUTO: 93.7 FL
MONOCYTES # BLD AUTO: 0.56 X10(3) UL (ref 0.1–1)
MONOCYTES NFR BLD AUTO: 6.8 %
NEUTROPHILS # BLD AUTO: 6.19 X10 (3) UL (ref 1.5–7.7)
NEUTROPHILS # BLD AUTO: 6.19 X10(3) UL (ref 1.5–7.7)
NEUTROPHILS NFR BLD AUTO: 75.6 %
PLATELET # BLD AUTO: 271 10(3)UL (ref 150–450)
RBC # BLD AUTO: 4.9 X10(6)UL
WBC # BLD AUTO: 8.2 X10(3) UL (ref 4–11)

## 2024-07-04 ENCOUNTER — APPOINTMENT (OUTPATIENT)
Dept: CT IMAGING | Age: 79
End: 2024-07-04
Attending: EMERGENCY MEDICINE
Payer: MEDICARE

## 2024-07-04 ENCOUNTER — HOSPITAL ENCOUNTER (EMERGENCY)
Age: 79
Discharge: HOME | End: 2024-07-04
Attending: EMERGENCY MEDICINE
Payer: MEDICARE

## 2024-07-04 ENCOUNTER — HOSPITAL ENCOUNTER (EMERGENCY)
Facility: HOSPITAL | Age: 79
Discharge: HOME | End: 2024-07-04
Attending: EMERGENCY MEDICINE
Payer: MEDICARE

## 2024-07-04 VITALS
HEIGHT: 65 IN | HEART RATE: 99 BPM | TEMPERATURE: 98 F | DIASTOLIC BLOOD PRESSURE: 96 MMHG | SYSTOLIC BLOOD PRESSURE: 150 MMHG | WEIGHT: 190 LBS | RESPIRATION RATE: 20 BRPM | BODY MASS INDEX: 31.65 KG/M2 | OXYGEN SATURATION: 97 %

## 2024-07-04 VITALS
OXYGEN SATURATION: 98 % | SYSTOLIC BLOOD PRESSURE: 132 MMHG | BODY MASS INDEX: 29.99 KG/M2 | RESPIRATION RATE: 16 BRPM | HEIGHT: 65 IN | TEMPERATURE: 98 F | HEART RATE: 79 BPM | DIASTOLIC BLOOD PRESSURE: 79 MMHG | WEIGHT: 180 LBS

## 2024-07-04 DIAGNOSIS — K95.09 GASTRIC BAND MALFUNCTION: Primary | ICD-10-CM

## 2024-07-04 DIAGNOSIS — K56.600 PARTIAL INTESTINAL OBSTRUCTION, UNSPECIFIED CAUSE (HCC): ICD-10-CM

## 2024-07-04 DIAGNOSIS — Z46.51 ENCOUNTER FOR ADJUSTMENT OF GASTRIC LAP BAND: Primary | ICD-10-CM

## 2024-07-04 LAB
ALBUMIN SERPL-MCNC: 4 G/DL (ref 3.4–5)
ALBUMIN/GLOB SERPL: 1 {RATIO} (ref 1–2)
ALP LIVER SERPL-CCNC: 61 U/L
ALT SERPL-CCNC: 20 U/L
ANION GAP SERPL CALC-SCNC: 10 MMOL/L (ref 0–18)
AST SERPL-CCNC: 17 U/L (ref 15–37)
BASOPHILS # BLD AUTO: 0.04 X10(3) UL (ref 0–0.2)
BASOPHILS NFR BLD AUTO: 0.5 %
BILIRUB SERPL-MCNC: 0.4 MG/DL (ref 0.1–2)
BUN BLD-MCNC: 28 MG/DL (ref 9–23)
CALCIUM BLD-MCNC: 10.2 MG/DL (ref 8.5–10.1)
CHLORIDE SERPL-SCNC: 103 MMOL/L (ref 98–112)
CO2 SERPL-SCNC: 25 MMOL/L (ref 21–32)
CREAT BLD-MCNC: 1.34 MG/DL
EGFRCR SERPLBLD CKD-EPI 2021: 41 ML/MIN/1.73M2 (ref 60–?)
EOSINOPHIL # BLD AUTO: 0.04 X10(3) UL (ref 0–0.7)
EOSINOPHIL NFR BLD AUTO: 0.5 %
ERYTHROCYTE [DISTWIDTH] IN BLOOD BY AUTOMATED COUNT: 12.3 %
GLOBULIN PLAS-MCNC: 4 G/DL (ref 2.8–4.4)
GLUCOSE BLD-MCNC: 123 MG/DL (ref 70–99)
HCT VFR BLD AUTO: 45.1 %
HGB BLD-MCNC: 15.2 G/DL
IMM GRANULOCYTES # BLD AUTO: 0.02 X10(3) UL (ref 0–1)
IMM GRANULOCYTES NFR BLD: 0.2 %
LIPASE SERPL-CCNC: 23 U/L (ref 13–75)
LYMPHOCYTES # BLD AUTO: 1.64 X10(3) UL (ref 1–4)
LYMPHOCYTES NFR BLD AUTO: 19.4 %
MCH RBC QN AUTO: 30.5 PG (ref 26–34)
MCHC RBC AUTO-ENTMCNC: 33.7 G/DL (ref 31–37)
MCV RBC AUTO: 90.6 FL
MONOCYTES # BLD AUTO: 0.46 X10(3) UL (ref 0.1–1)
MONOCYTES NFR BLD AUTO: 5.4 %
NEUTROPHILS # BLD AUTO: 6.27 X10 (3) UL (ref 1.5–7.7)
NEUTROPHILS # BLD AUTO: 6.27 X10(3) UL (ref 1.5–7.7)
NEUTROPHILS NFR BLD AUTO: 74 %
OSMOLALITY SERPL CALC.SUM OF ELEC: 293 MOSM/KG (ref 275–295)
PLATELET # BLD AUTO: 260 10(3)UL (ref 150–450)
POTASSIUM SERPL-SCNC: 4.4 MMOL/L (ref 3.5–5.1)
PROT SERPL-MCNC: 8 G/DL (ref 6.4–8.2)
RBC # BLD AUTO: 4.98 X10(6)UL
SODIUM SERPL-SCNC: 138 MMOL/L (ref 136–145)
WBC # BLD AUTO: 8.5 X10(3) UL (ref 4–11)

## 2024-07-04 PROCEDURE — 99282 EMERGENCY DEPT VISIT SF MDM: CPT

## 2024-07-04 PROCEDURE — 85025 COMPLETE CBC W/AUTO DIFF WBC: CPT | Performed by: EMERGENCY MEDICINE

## 2024-07-04 PROCEDURE — 99284 EMERGENCY DEPT VISIT MOD MDM: CPT

## 2024-07-04 PROCEDURE — 74176 CT ABD & PELVIS W/O CONTRAST: CPT | Performed by: EMERGENCY MEDICINE

## 2024-07-04 PROCEDURE — 96374 THER/PROPH/DIAG INJ IV PUSH: CPT

## 2024-07-04 PROCEDURE — 83690 ASSAY OF LIPASE: CPT | Performed by: EMERGENCY MEDICINE

## 2024-07-04 PROCEDURE — 99283 EMERGENCY DEPT VISIT LOW MDM: CPT

## 2024-07-04 PROCEDURE — 80053 COMPREHEN METABOLIC PANEL: CPT | Performed by: EMERGENCY MEDICINE

## 2024-07-04 PROCEDURE — 99285 EMERGENCY DEPT VISIT HI MDM: CPT

## 2024-07-04 PROCEDURE — 96361 HYDRATE IV INFUSION ADD-ON: CPT

## 2024-07-04 PROCEDURE — 96375 TX/PRO/DX INJ NEW DRUG ADDON: CPT

## 2024-07-04 RX ORDER — MORPHINE SULFATE 4 MG/ML
4 INJECTION, SOLUTION INTRAMUSCULAR; INTRAVENOUS ONCE
Status: COMPLETED | OUTPATIENT
Start: 2024-07-04 | End: 2024-07-04

## 2024-07-04 RX ORDER — ONDANSETRON 2 MG/ML
4 INJECTION INTRAMUSCULAR; INTRAVENOUS ONCE
Status: COMPLETED | OUTPATIENT
Start: 2024-07-04 | End: 2024-07-04

## 2024-07-04 NOTE — ED INITIAL ASSESSMENT (HPI)
Pt in er for c/o nausea, vomiting, and abd pain, pt reports that she thinks her lap  band is closing up on her

## 2024-07-04 NOTE — ED PROVIDER NOTES
Patient Seen in: [facility] In [place]      History     Chief Complaint   Patient presents with    Abdomen/Flank Pain     Stated Complaint: pt reports having trouble with her lap band    Subjective:   HPI    This is a 79-year-old female past medical history of hypertension, Lap-Band surgery 2018 by Dr. Guerra who presents with abdominal pain and vomiting.  Patient reports that she was living in [place] and in March she saw a bariatric surgeon there who added more filler to her Lap-Band.  She feels like maybe it is too much filler.  Over the last couple of weeks she is not sporadically vomiting.  She states she is only drinking liquids and soft foods.  She cannot tolerate meat or vegetables.  She saw her PCP on  and was referred to bariatric surgery Dr. Wynn but is yet to make an appointment.  Over the last couple of days is gotten acutely worse and she has been vomiting.  She had a small bowel movement this morning.  No fevers.  No urinary symptoms.  She states she is not passing gas.  No fevers.  Prior abdominal surgery includes tubal ligation.  She presents here with family for further evaluation.            Objective:   Past Medical History:    Arm fracture, right    Depression    Essential hypertension    History of arm fracture              Past Surgical History:   Procedure Laterality Date    Lap gastr bypass incl smll i            x 4    Tubal ligation                  Social History     Socioeconomic History    Marital status: Unknown   Tobacco Use    Smoking status: Never     Passive exposure: Never    Smokeless tobacco: Never   Vaping Use    Vaping status: Never Used   Substance and Sexual Activity    Alcohol use: Yes     Alcohol/week: 0.0 standard drinks of alcohol     Comment: occ    Drug use: No     Social Determinants of Health      [facility]    Social Connections   Housing Stability: Low Risk  (2023)     Received from [facility], [facility]    Housing Stability Vital Sign     Unable to Pay for Housing in the Last Year: No     Number of Places Lived in the Last Year: 2     Unstable Housing in the Last Year: No              Review of Systems    Positive for stated Chief Complaint: Abdomen/Flank Pain    Other systems are as noted in HPI.  Constitutional and vital signs reviewed.      All other systems reviewed and negative except as noted above.    Physical Exam     ED Triage Vitals [07/04/24 0545]   BP (!) 150/96   Pulse 99   Resp 20   Temp 98.4 °F (36.9 °C)   Temp src Oral   SpO2 97 %   O2 Device None (Room air)       Current Vitals:   Vital Signs  BP: (!) 150/96  Pulse: 99  Resp: 20  Temp: 98.4 °F (36.9 °C)  Temp src: Oral    Oxygen Therapy  SpO2: 97 %  O2 Device: None (Room air)            Physical Exam    GENERAL: Awake, alert oriented x3, nontoxic appearing.   SKIN: Normal, warm, and dry.  HEENT:  Pupils equally round and reactive to light. Conjuctiva clear.  Oropharynx is clear and moist.   Lungs: Clear to auscultation bilaterally with no rales, no retractions, and no wheezing.  HEART:  Regular rate and rhythm. S1 and S2. No murmurs, no rubs or gallops.   ABDOMEN: Soft, nontender and nondistended. Normoactive bowel sounds. No rebound. No guarding.  Able to palpate gastric band sleeve.  EXTREMITIES: Warm with brisk capillary refill.         ED Course     Labs Reviewed   COMP METABOLIC PANEL (14) - Abnormal; Notable for the following components:       Result Value    Glucose 123 (*)     BUN 28 (*)     Creatinine 1.34 (*)     Calcium, Total 10.2 (*)     eGFR-Cr 41 (*)     All other components within normal limits   LIPASE - Normal   CBC WITH DIFFERENTIAL WITH PLATELET    Narrative:     The following orders were created for panel order CBC With Differential With Platelet.  Procedure                               Abnormality         Status                     ---------                                -----------         ------                     CBC W/ DIFFERENTIAL[[id number]]                              Final result                 Please view results for these tests on the individual orders.   URINALYSIS, ROUTINE   CBC W/ DIFFERENTIAL        CT ABDOMEN+PELVIS(CPT=74176)    Result Date: 7/4/2024  PROCEDURE:  CT ABDOMEN+PELVIS (CPT=74176)  COMPARISON:  [facility], XR, XR CHEST PA + LAT CHEST (CPT=71046), 9/20/2019, 2:56 PM.  INDICATIONS:  pt reports having trouble with her lap band. had iller added in March. Now vomiting wo weeks. only able pass liquids sof food  TECHNIQUE:  Unenhanced multislice CT scanning was performed from the dome of the diaphragm to the pubic symphysis.  Dose reduction techniques were used. Dose information is transmitted to the ACR (American College of Radiology) NRDR (National Radiology Data Registry) which includes the Dose Index Registry.  PATIENT STATED HISTORY: (As transcribed by Technologist)  Patient states she has been throwing up for last few weeks, h/o lab band 6 years ago and adjusted again in march 2024    FINDINGS:  LIVER:  No enlargement, atrophy, abnormal density, or significant focal lesion.  BILIARY:  There is density in the dependent part of the gallbladder most likely indicating noncalcified cholelithiasis or gallbladder sludge. PANCREAS:  Diffuse pancreatic atrophy is noted. SPLEEN:  No enlargement or focal lesion.  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  There is no obstructing stone or hydronephrosis. ADRENALS:  No mass or enlargement.  AORTA/VASCULAR:    Aorta is atherosclerotic but not aneurysmal. RETROPERITONEUM:  No mass or adenopathy.  BOWEL/MESENTERY:  There is a gastric lap band present.  This low lap band has changed significantly in position since the prior chest radiograph of September 20, 2019.  This is best appreciated on review of the AP  image as compared to the prior chest radiograph.  The lap band appears to the  [place] more distal gastric body with marked retention of fluid and debris in the stomach proximal to lap band and marked fluid-filled distention of the esophagus.  This is suspected to represent a cyst ellipse of the lap band onto the or distal part of the stomach although could potentially represent a surgical alteration of the lap band.  Correlation with known clinical history is necessary.  There is a cystic appearing lesion noted anterior to the duodenum which is seen series 2, image 37 measuring up to 2.2 cm.  Significance of this is uncertain.  This could potentially represent a duplication cyst or an exophytic cystic lesion of the pancreas.  There is diverticulosis of the colon.  There is no CT evidence of diverticulitis. ABDOMINAL WALL:  No mass or hernia.  URINARY BLADDER:  No visible focal wall thickening, lesion, or calculus.  PELVIC NODES:  No adenopathy.  PELVIC ORGANS:  No visible mass.  Pelvic organs appropriate for patient age.  BONES:  No bony lesion or fracture.  LUNG BASES:  Right lower lobe lung nodule series 301, image 14 measures 0.8 cm. OTHER:  Negative.             CONCLUSION:  1. Abnormal position of gastric lap band is noted which has changed significantly since prior chest radiograph.  This is most likely a distal slip of the lap band.  There is moderate to marked distension of this stomach proximal to lap band and distention of the esophagus suggesting some degree of obstruction related to the lap band.  It is possible this represents an intended modification of the lap band although this is considered less likely.  Correlation with any known surgical history is necessary.  Surgical consultation is recommended. 2. Cystic lesion noted anterior to the duodenum near pancreatic head is described above.  This is incompletely characterized on a noncontrast study.  This could represent a GI duplication cyst or cystic lesion of pancreas.  Dedicated contrast-enhanced MRI is recommended. 3. 8 mm  right lower lobe lung nodule. 2017 guidelines from the Fleischner Society recommends: In low risk patients, CT in 6 to 12 months, then consider CT in 18 to 24 months.  In high risk patients, CT in 6 to 12 months, then obtain CT in 18 to 24 months.      LOCATION:  [facility]   Dictated by (CST): Davie Pickering MD on 7/04/2024 at 7:01 AM     Finalized by (CST): Davie Pickering MD on 7/04/2024 at 7:08 AM                   MDM      This is a 79-year-old female past medical history of hypertension, Lap-Band surgery 2018 by Dr. Guerra who presents with abdominal pain and vomiting.  Differential includes bowel obstruction, pancreatitis, Lap-Band complication.        IV line was established of normal saline.  1 L bolus given.  IV Zofran for nausea.  Morphine for pain.  Basic labs were obtained.  CBC: White blood cell count 8.5.  Hemoglobin 15.2.  Platelet 260.  CMP: BUN 28.  Creatinine 1.34.  Glucose 123.  Bicarb 25.      CT scan abdomen was obtained without contrast due to renal function.  CT scan demonstrated abnormal position with a gastric lap band is noted which is significantly changed from prior chest x-ray.  Most likely a distal slip of the Lap-Band.  Marked distention of the stomach proximal to the band and distention of the esophagus suggesting some degree of obstruction.      Findings were communicated to patient.      Discussed case with bariatric surgery Dr. Ramirez.  Patient will go directly to [place] ER for treatment.  Dr. De La Cruz's to be notified when she gets there.  I did contact [place] [facility] and talk to emergency physician on staff.  Patient will be driven by her family member.  She is instructed not to eat or drink and rocked.          Disposition and Plan     Clinical Impression:  1. Gastric band malfunction         Disposition:  Discharge  7/4/2024  7:38 am    Follow-up:  Jayesh Ramirez MD  [facility] [address] [facility] [address]  [facility] [id number] [phone]    Follow up            Medications Prescribed:  Current Discharge  Medication List

## 2024-07-04 NOTE — ED INITIAL ASSESSMENT (HPI)
Sent from [facility] er to meet Dr. Ramirez for adjustment to lap band      C/o vomiting, abd pain in previous ER. Unable to keep food/drink down

## 2024-07-04 NOTE — Clinical Note
Go directly to [place] [facility] emergency room and tell them to notify Dr. Ramirez who will come and decompress her gastric band.  Do not eat or drink anything and route to the hospital

## 2024-07-04 NOTE — CONSULTS
[facility] EMERGENCY DEPARTMENT  [address]  Health system [address]  Dept: [phone]  Loc: [phone]    2024  Bariatric Surgery Patient Evaluation    Chief Complaint:  nausea/vomiting, history of lap band    History of Present Illness:  Lb Johns is a 79 year old female who initially presented to the ER at [place] but was told to come to [place] for bariatric evaluation.  For the past 3 weeks or so she has had severe nausea and vomiting with p.o. intake.  Band was placed in  by Dr. Wing and she initially lost quite a bit of weight, regained some, is about 55 pounds down currently.  She had a band fill in March in [place] and thinks there is 4 mL of fluid in after getting it tightened.  In the ER included a CT scan of the abdomen which shows a pretty badly slipped band with significant dilated stomach proximal to the band.    Past Medical History:    Past Medical History:    Arm fracture, right    Depression    Essential hypertension    History of arm fracture       Past Surgical History:    Past Surgical History:   Procedure Laterality Date    Lap gastr bypass incl smll i            x 4    Tubal ligation         Family History:    Family History   Problem Relation Age of Onset    Diabetes Father     Heart Disorder Father     Diabetes Mother     Breast Cancer Mother 90            Other (Other) Mother         thyroid    Psychiatric Daughter     Breast Cancer Maternal Aunt 80        deseased    Breast Cancer Maternal Cousin Female 55               Social History:    Social History     Socioeconomic History    Marital status: Unknown   Tobacco Use    Smoking status: Never     Passive exposure: Never    Smokeless tobacco: Never   Vaping Use    Vaping status: Never Used   Substance and Sexual Activity    Alcohol use: Yes     Alcohol/week: 0.0 standard drinks of alcohol     Comment: occ    Drug use: No       Medications:   Current Outpatient  Medications:     buPROPion HCl ER, Smoking Det, 150 MG Oral Tablet 12 Hr, Take 1 tablet (150 mg total) by mouth 2 (two) times daily., Disp: 180 tablet, Rfl: 0    donepezil 10 MG Oral Tab, Take 1 tablet (10 mg total) by mouth nightly., Disp: 90 tablet, Rfl: 3    flecainide 50 MG Oral Tab, Take 1 tablet (50 mg total) by mouth Q12H., Disp: 90 tablet, Rfl: 0    metoprolol succinate ER 25 MG Oral Tablet 24 Hr, Take 1 tablet (25 mg total) by mouth daily., Disp: 90 tablet, Rfl: 0    Ferrous Gluconate 239 (27 Fe) MG Oral Tab, As Directed., Disp: , Rfl:     Aminolevulinic Acid HCl (LEVULAN PAULAK) 20 % External Recon Soln, , Disp: , Rfl:     MULTIPLE VITAMINS ESSENTIAL OR, 1 Dose., Disp: , Rfl:     ipratropium 0.06 % Nasal Solution, 2 sprays 3 (three) times daily. (Patient not taking: Reported on 12/14/2023), Disp: , Rfl:     levocetirizine (XYZAL ALLERGY 24HR) 5 MG Oral Tab, Xyzal (Patient not taking: Reported on 12/14/2023), Disp: , Rfl:     Iron-Vitamin C (IRON 100/C OR), Take by mouth. (Patient not taking: Reported on 7/1/2024), Disp: , Rfl:     aspirin 81 MG Oral Tab, Take 1 tablet (81 mg total) by mouth daily., Disp: , Rfl:      Allergies:    Allergies   Allergen Reactions    Betadine [Povidone Iodine] HIVES    Nifedical Xl [Nifedipine] UNKNOWN and FACE FLUSHING     reddness and anxiety       ROS:      Constitutional: negative  HEENT: negative  Respiratory: negative  Cardiovascular: negative  Gastrointestinal: As per HPI  Genitourinary: negative  Musculoskeletal: negative  Neurological: negative  Psychological: negative  Endocrine: negative  Immunologic: negative  Integumentary: negative      Physical Exam:  Vital Signs:  /79   Pulse 79   Temp 98 °F (36.7 °C) (Oral)   Resp 16   Ht 5' 5\" (1.651 m)   Wt 180 lb (81.6 kg)   SpO2 98%   BMI 29.95 kg/m²   BMI:  Body mass index is 29.95 kg/m².  General: no acute distress, well-nourished  HENT: normocephalic, atraumatic  Lung: breathing comfortably, symmetrical  expansion, no wheezing  Heart: regular rate and rhythm  Abdomen: soft, obese, non-tender, non-distended, no hernia/mass/organomegaly, adjustment port is palpable on the left just inferior to the larger incision  Extremities: normal strength, normal ROM, walks without assist device  Neuro: no focal deficits, cranial nerves grossly intact  Psych: alert and oriented, normal affect  Skin: warm, dry    Assessment: 79 year old female with chronic morbid obesity who underwent lap band placement about 6 years ago, now has a severe slip.  Removal of all the fluid is indicated.  She will need the band removed at some point, either later on as an outpatient if removing the fluid gives her relief of her symptoms, otherwise as an emergent case if it does not.      Plan:   Risks of band adjustment such as bleeding, infection, band being too tight or too loose, or failure to achieve desired weight loss was discussed with the patient who understood and wished to proceed.    Skin was prepped with alcohol and then accessed with a long Pimentel needle.  4 mL of clear fluid was withdrawn leaving the band completely deflated. The patient was given water and drank without difficulty then stayed in the ER for observation post-procedure to ensure tolerance.    Follow-up for discussion of band removal    Jayesh Ramirez MD, 07/04/24, 10:33 AM

## 2024-07-06 NOTE — ED PROVIDER NOTES
Patient Seen in: [facility] Emergency Department    History     Chief Complaint   Patient presents with    Other     Stated Complaint: [place] er sent over    HPI    Patient presents from outside with complaint of gastric obstruction due to slipped lap band.  Was seen at outside could not keep anything down, ct revealed obstruction due to slipped band.  Bariatrics was called and sent to [facility] ER.  Currently not vomiting pain controlled.  Alleviating factors: none  Exacerbating factors: eating or drinking    Past Medical History:    Arm fracture, right    Depression    Essential hypertension    History of arm fracture       Past Surgical History:   Procedure Laterality Date    Lap gastr bypass incl smll i            x 4    Tubal ligation              Family History   Problem Relation Age of Onset    Diabetes Father     Heart Disorder Father     Diabetes Mother     Breast Cancer Mother 90            Other (Other) Mother         thyroid    Psychiatric Daughter     Breast Cancer Maternal Aunt 80        deseased    Breast Cancer Maternal Cousin Female 55               Social History     Socioeconomic History    Marital status: Unknown   Tobacco Use    Smoking status: Never     Passive exposure: Never    Smokeless tobacco: Never   Vaping Use    Vaping status: Never Used   Substance and Sexual Activity    Alcohol use: Yes     Alcohol/week: 0.0 standard drinks of alcohol     Comment: occ    Drug use: No     Social Determinants of Health      [facility]    Social Connections   Housing Stability: Low Risk  (2023)    Received from [facility], [facility]    Housing Stability Vital Sign     Unable to Pay for Housing in the Last Year: No     Number of Places Lived in the Last Year: 2     Unstable Housing in the Last Year: No       Review of Systems    Positive for stated complaint: [place] er  sent over  Other systems are as noted in HPI.  Constitutional and vital signs reviewed.      All other systems reviewed and negative except as noted above.    PSFH elements reviewed from today and agreed except as otherwise stated in HPI.    Physical Exam     ED Triage Vitals   BP 07/04/24 0829 122/87   Pulse 07/04/24 0829 79   Resp 07/04/24 0829 18   Temp 07/04/24 0829 98 °F (36.7 °C)   Temp src 07/04/24 0829 Oral   SpO2 07/04/24 0829 96 %   O2 Device 07/04/24 0845 None (Room air)       Current:/79   Pulse 79   Temp 98 °F (36.7 °C) (Oral)   Resp 16   Ht 165.1 cm (5' 5\")   Wt 81.6 kg   SpO2 98%   BMI 29.95 kg/m²    PULSE OX nl  GENERAL: awake non toxic  HEAD: normocephalic, atraumatic,   EYES: PERRLA, EOMI, conj sclera clear  THROAT: mmm, no lesions  NECK: supple, no meningeal signs  LUNGS: no resp distress, n  CARDIO: RR  Abd-obese non distended min epigatric tenderness  EXTREMITIES: from, 5/5 strength in all 4 ext, no edema  NEURO: alert and oiented *3, 2-12 intact, no focal deficit noted  SKIN: good skin turgor, no  rashes  PSYCH: calm, cooperative,      ED Course   Labs Reviewed - No data to display    MDM       Cardiac Monitor:   Pulse Readings from Last 1 Encounters:   07/04/24 79   ,     Radiology findings:   No results found.        Medical Decision Making  Problems Addressed:  Encounter for adjustment of gastric lap band: acute illness or injury  Partial intestinal obstruction, unspecified cause (HCC): acute illness or injury    Amount and/or Complexity of Data Reviewed  External Data Reviewed: radiology and notes.     Details: Reviewed encounter from outside.    Discussion of management or test interpretation with external provider(s): Dr. Ramirez in ER to decompress lap band post patient able to swallow no vomiting will dc.        Disposition and Plan     Clinical Impression:  1. Encounter for adjustment of gastric lap band    2. Partial intestinal obstruction, unspecified cause (HCC)         Disposition:  Discharge    Follow-up:  Clint Crook  [facility] [address]  [phone]    Follow up        Medications Prescribed:  Discharge Medication List as of 7/4/2024 10:45 AM

## 2024-07-09 DIAGNOSIS — I47.10 PAROXYSMAL SVT (SUPRAVENTRICULAR TACHYCARDIA) (HCC): ICD-10-CM

## 2024-07-09 NOTE — TELEPHONE ENCOUNTER
Script for Flecainide 50 mg BID  #90 sent 7/2/24    Patient asking for #180  for 90 day supply    Routed to Dr Avila as covering provider to advise

## 2024-07-10 RX ORDER — FLECAINIDE ACETATE 50 MG/1
50 TABLET ORAL EVERY 12 HOURS
Qty: 180 TABLET | Refills: 0 | OUTPATIENT
Start: 2024-07-10

## 2024-07-17 ENCOUNTER — TELEPHONE (OUTPATIENT)
Dept: FAMILY MEDICINE CLINIC | Facility: CLINIC | Age: 79
End: 2024-07-17

## 2024-07-17 DIAGNOSIS — Z12.83 SCREENING FOR SKIN CANCER: Primary | ICD-10-CM

## 2024-07-17 NOTE — TELEPHONE ENCOUNTER
Patient's name and  verified     Patient just to see needs to see a dermatologist for a regular skin check.    Please Advise

## 2024-07-18 NOTE — TELEPHONE ENCOUNTER
Referred to Provider Information:  Provider Address Phone   Odessa Simon MD [address] [id number] [phone]     Patient notified via detailed voicemail left at cell number (ok per  HIPAA consent)  Number to schedule with Dr Simon provided in message

## 2024-09-20 ENCOUNTER — TELEPHONE (OUTPATIENT)
Dept: FAMILY MEDICINE CLINIC | Facility: CLINIC | Age: 79
End: 2024-09-20

## 2024-09-20 NOTE — TELEPHONE ENCOUNTER
Lab Frequency Next Occurrence   [facility] JESUS 2D+3D SCREENING BILAT (CPT=77067/11833) Once 07/01/2024     Letter mailed to patient reminding them they have outstanding orders.

## 2024-10-03 ENCOUNTER — NURSE ONLY (OUTPATIENT)
Dept: FAMILY MEDICINE CLINIC | Facility: CLINIC | Age: 79
End: 2024-10-03
Payer: MEDICARE

## 2024-10-03 DIAGNOSIS — N28.9 FUNCTION KIDNEY DECREASED: Primary | ICD-10-CM

## 2024-10-03 LAB
ANION GAP SERPL CALC-SCNC: 7 MMOL/L (ref 0–18)
BUN BLD-MCNC: 26 MG/DL (ref 9–23)
CALCIUM BLD-MCNC: 9.4 MG/DL (ref 8.7–10.4)
CHLORIDE SERPL-SCNC: 110 MMOL/L (ref 98–112)
CO2 SERPL-SCNC: 24 MMOL/L (ref 21–32)
CREAT BLD-MCNC: 1.39 MG/DL
EGFRCR SERPLBLD CKD-EPI 2021: 39 ML/MIN/1.73M2 (ref 60–?)
FASTING STATUS PATIENT QL REPORTED: NO
GLUCOSE BLD-MCNC: 76 MG/DL (ref 70–99)
OSMOLALITY SERPL CALC.SUM OF ELEC: 296 MOSM/KG (ref 275–295)
POTASSIUM SERPL-SCNC: 4.5 MMOL/L (ref 3.5–5.1)
SODIUM SERPL-SCNC: 141 MMOL/L (ref 136–145)

## 2024-10-03 PROCEDURE — 80048 BASIC METABOLIC PNL TOTAL CA: CPT | Performed by: FAMILY MEDICINE

## 2024-10-03 NOTE — PROGRESS NOTES
Patient to clinic for labs per Dr Tc neff tube drawn right AC x 1 attempt    Patient given information for [facility] and [facility].  She will see who has best location and availability to schedule.    Patient left office in stable condition

## 2024-12-17 RX ORDER — BUPROPION HYDROCHLORIDE 150 MG/1
150 TABLET, FILM COATED, EXTENDED RELEASE ORAL 2 TIMES DAILY
Qty: 180 TABLET | Refills: 0 | Status: SHIPPED | OUTPATIENT
Start: 2024-12-17

## 2024-12-17 NOTE — TELEPHONE ENCOUNTER
Routing to provider per protocol.   buPROPion HCl ER, Smoking Det, 150 MG Oral Tablet 12 Hr   Last refilled on 7/1/24 for #180  with 0 rf.   Last labs 10/3/24.   Last seen on 7/1/24.     No future appointments.       Thank you.

## 2025-01-27 ENCOUNTER — OFFICE VISIT (OUTPATIENT)
Dept: FAMILY MEDICINE CLINIC | Facility: CLINIC | Age: 80
End: 2025-01-27
Payer: MEDICARE

## 2025-01-27 VITALS
BODY MASS INDEX: 30 KG/M2 | DIASTOLIC BLOOD PRESSURE: 72 MMHG | HEART RATE: 88 BPM | SYSTOLIC BLOOD PRESSURE: 128 MMHG | WEIGHT: 183 LBS | OXYGEN SATURATION: 97 % | RESPIRATION RATE: 18 BRPM | TEMPERATURE: 97 F

## 2025-01-27 DIAGNOSIS — R53.1 GENERALIZED WEAKNESS: ICD-10-CM

## 2025-01-27 DIAGNOSIS — J18.9 PNEUMONIA OF BOTH LUNGS DUE TO INFECTIOUS ORGANISM, UNSPECIFIED PART OF LUNG: ICD-10-CM

## 2025-01-27 DIAGNOSIS — Z98.84 LAP-BAND SURGERY STATUS: ICD-10-CM

## 2025-01-27 DIAGNOSIS — R53.81 PHYSICAL DECONDITIONING: Primary | ICD-10-CM

## 2025-01-27 PROCEDURE — 99214 OFFICE O/P EST MOD 30 MIN: CPT | Performed by: FAMILY MEDICINE

## 2025-01-27 PROCEDURE — 1159F MED LIST DOCD IN RCRD: CPT | Performed by: FAMILY MEDICINE

## 2025-01-27 NOTE — PROGRESS NOTES
Lb Johns is a 80 year old female.  Chief Complaint   Patient presents with    Follow - Up       HPI:   Was admitted at [facility] 1/12-1/17/24.   Had abd pain and went to ER. They checked the lap band, was told it was okay.   2 days after being there. Developed PNA.   Then GI came and said lap band was out of place. They took the fluid out, that didn't help, so they took the whole band out.   They took out the lap band entirely.   Her pain is gone now. Incisions are not painful.   Trying to eat better. Eating healthy.   Was 20 lbs heavier after hospital stay, but losing about 1 lb per day and peeing a lot since being home.   Focusing on protein and fruits   She is feeling unsteady from laying in bed x 2 weeks.   Trying to walk more.   She drove herself here and felt okay.   Was told to see surgery only as needed.   She was not given any restrictions for surgery.     Will be following up with pulm for lungs.   Not scheduled to see surgery, does not want to go to her [facility] doctor.     ALLERGIES:  Allergies[1]      Current Outpatient Medications   Medication Sig Dispense Refill    BUPROPION HCL ER, SMOKING DET, 150 MG Oral Tablet 12 Hr TAKE 1 TABLET(150 MG) BY MOUTH TWICE DAILY 180 tablet 0    donepezil 10 MG Oral Tab Take 1 tablet (10 mg total) by mouth nightly. 90 tablet 3    flecainide 50 MG Oral Tab Take 1 tablet (50 mg total) by mouth Q12H. 90 tablet 0    metoprolol succinate ER 25 MG Oral Tablet 24 Hr Take 1 tablet (25 mg total) by mouth daily. 90 tablet 0    Ferrous Gluconate 239 (27 Fe) MG Oral Tab As Directed.      MULTIPLE VITAMINS ESSENTIAL OR 1 Dose.      aspirin 81 MG Oral Tab Take 1 tablet (81 mg total) by mouth daily.      Aminolevulinic Acid HCl (LEVMANOLO CORTES) 20 % External Recon Soln  (Patient not taking: Reported on 1/27/2025)      ipratropium 0.06 % Nasal Solution 2 sprays 3 (three) times daily. (Patient not taking: Reported on 1/27/2025)      levocetirizine (XYZAL ALLERGY 24HR) 5 MG  Oral Tab Xyzal (Patient not taking: Reported on 1/27/2025)      Iron-Vitamin C (IRON 100/C OR) Take by mouth. (Patient not taking: Reported on 1/27/2025)        Past Medical History:    Arm fracture, right    Depression    Essential hypertension    History of arm fracture      Social History:  Social History     Socioeconomic History    Marital status: Unknown   Tobacco Use    Smoking status: Never     Passive exposure: Never    Smokeless tobacco: Never   Vaping Use    Vaping status: Never Used   Substance and Sexual Activity    Alcohol use: Yes     Alcohol/week: 0.0 standard drinks of alcohol     Comment: occ    Drug use: No     Social Drivers of Health     Food Insecurity: No Food Insecurity (1/27/2025)    NCSS - Food Insecurity     Worried About Running Out of Food in the Last Year: No     Ran Out of Food in the Last Year: No   Transportation Needs: No Transportation Needs (1/27/2025)    NCSS - Transportation     Lack of Transportation: No    [facility]    Social Connections   Housing Stability: Not At Risk (1/27/2025)    NCSS - Housing/Utilities     Has Housing: Yes     Worried About Losing Housing: No     Unable to Get Utilities: No        BP Readings from Last 6 Encounters:   01/27/25 128/72   07/04/24 132/79   07/04/24 (!) 150/96   07/01/24 136/80   12/14/23 120/70   08/19/22 100/74       Wt Readings from Last 6 Encounters:   01/27/25 183 lb (83 kg)   07/04/24 180 lb (81.6 kg)   07/04/24 190 lb (86.2 kg)   07/01/24 177 lb 9.6 oz (80.6 kg)   12/14/23 181 lb 6 oz (82.3 kg)   08/19/22 181 lb (82.1 kg)       REVIEW OF SYSTEMS:   GENERAL HEALTH: feels well no complaints other than above   SKIN: denies any unusual skin lesions or rashes  RESPIRATORY: denies shortness of breath   CARDIOVASCULAR: denies chest pain    GI: denies abdominal pain and denies heartburn  NEURO: denies headaches, occasional dizziness     EXAM:   /72   Pulse 88   Temp  97 °F (36.1 °C) (Temporal)   Resp 18   Wt 183 lb (83 kg)   SpO2 97%   BMI 30.45 kg/m²  Body mass index is 30.45 kg/m².      GENERAL: well developed, well nourished,in no apparent distress  SKIN: no rashes,no suspicious lesions  HEENT: atraumatic, normocephalic,ears and throat are clear  NECK: supple,no adenopathy   LUNGS: clear to auscultation, no rales or wheezing   CARDIO: RRR without murmur  GI: good BS's,no masses, HSM or tenderness  EXTREMITIES: no cyanosis, clubbing or edema    ASSESSMENT AND PLAN:     Encounter Diagnoses   Name Primary?    Physical deconditioning Yes    Generalized weakness     LAP-BAND surgery status     Pneumonia of both lungs due to infectious organism, unspecified part of lung        Diagnoses and all orders for this visit:    Physical deconditioning  -     Physical Therapy Referral - [place] Location    Generalized weakness  -     Physical Therapy Referral - [place] [facility]    LAP-BAND surgery status    Pneumonia of both lungs due to infectious organism, unspecified part of lung    Lap band now removed completely and she is feeling better. Recommend leaving it out as this is the 2nd time it's caused a problem.   Follow up for post op check with surgeon who did removal since pt does not want to go all the way down town.   Lungs are clear. Follow up with pulm. If not able to get into pulm would do CXR in 4 weeks.     No orders of the defined types were placed in this encounter.              Meds & Refills for this Visit:  Requested Prescriptions      No prescriptions requested or ordered in this encounter             The patient indicates understanding of these issues and agrees to the plan.               [1]   Allergies  Allergen Reactions    Betadine [Povidone Iodine] HIVES    Nifedical Xl [Nifedipine] UNKNOWN and FACE FLUSHING     reddness and anxiety

## 2025-01-31 ENCOUNTER — TELEPHONE (OUTPATIENT)
Dept: PHYSICAL THERAPY | Facility: HOSPITAL | Age: 80
End: 2025-01-31

## 2025-02-04 ENCOUNTER — OFFICE VISIT (OUTPATIENT)
Dept: PHYSICAL THERAPY | Age: 80
End: 2025-02-04
Attending: FAMILY MEDICINE
Payer: MEDICARE

## 2025-02-04 DIAGNOSIS — R53.81 PHYSICAL DECONDITIONING: Primary | ICD-10-CM

## 2025-02-04 DIAGNOSIS — R53.1 GENERALIZED WEAKNESS: ICD-10-CM

## 2025-02-04 PROCEDURE — 97161 PT EVAL LOW COMPLEX 20 MIN: CPT | Performed by: PHYSICAL THERAPIST

## 2025-02-04 PROCEDURE — 97110 THERAPEUTIC EXERCISES: CPT | Performed by: PHYSICAL THERAPIST

## 2025-02-04 NOTE — PROGRESS NOTES
NEUROLOGICAL EVALUATION:     Diagnosis:   Physical deconditioning (R53.81)  Generalized weakness (R53.1) Patient:  Lb Johns (80 year old, female)        Referring Provider: Caro Montez  Today's Date   2/4/2025    Precautions:  Hearing Impairment; Fall Risk; Visual Impairment   Date of Evaluation: 02/04/25  Next MD visit: none scheduled  Date of Surgery: 1/14/2025     PATIENT SUMMARY   Summary of chief complaints: fear of falling, decreased stamina, overall weakness  History of current condition: Pt reports for the last couple years has been wobbling when she walks. Pt reports after surgery three weeks ago was barely getting around at all. Pt fell about a week ago but did not injure anything. Pt is still concerned about falling. Pt has fallen previously. No stamina left. Pt is independent with her self-care. Pt feels safe in the shower, makes sure she holds on when she gets out. Feels she can catch or drag the R>L foot with walking. Denies dizziness. No previous use of A.D. Pt has no stairs in her home. Pt currently lives independently.   Pain level: current 0 /10, at best 0 /10, at worst 0 /10  Description of symptoms: denies any sensory issues in the feet/ numbness/ tingling   Leisure activities/Hobbies: walking in the pool   Prior level of function: pt walked to the pool 3-4x/ week (about 3/4 mi away), did about 1 mi in the pool  Current limitations: walking > 5 min, standing > 5 min, walking on uneven surfaces, stair negotiation  Pt goals: to be able to walk normally, get around where I want to go & not be wobblying every few steps  Past medical history was reviewed with Lb.  Significant findings include: R eye retinal hole  Imaging/Tests: Pt reports she has not had any tests related to current condition/ concerns.   Lb  has a past medical history of Arm fracture, right (2016), Depression, Essential hypertension, and History of arm fracture (4/19/2016).  She  has a past surgical history that  includes tubal ligation; ; and lap gastr bypass incl smll i.    ASSESSMENT  Lb presents to physical therapy evaluation with primary c/o fear of falling, decreased stamina, overall weakness. The results of the objective tests and measures show impairments in balance, strength, functional stability. Functional deficits include but are not limited to walking > 5 min, standing > 5 min, walking on uneven surfaces, stair negotiation. Signs and symptoms are consistent with diagnosis of Physical deconditioning (R53.81)  Generalized weakness (R53.1). Pt and PT discussed evaluation findings, pathology, POC and HEP.  Pt voiced understanding and performs HEP correctly without reported pain. Skilled Physical Therapy is medically necessary to address the above impairments and reach functional goals.    OBJECTIVE:   Musculoskeletal:  Observation/Posture: B genu valgum, WBOS     ROMA ROM and Strength:  (* denotes performed with pain)  Hip   MMT (-/5)    R L     Flex (L2) 3+ 3+     Abd 3- 3-     ,   Knee   MMT (-/5)    R L     Flex 4+ 4+     Ext (L3) 4 4     ,   Ankle/Foot   MMT (-/5)    R L     DF (L4) 4+ 4+           Balance and Functional Mobility:  Mobility / Transfers Level of Assistance   Supine --> Sit IND   Sit --> Supine IND   Sit --> Stand IND   Stand --> Sit IND     Postural Control:  SLS: R: 3 sec; L: 2 sec; Fall Risk: Yes  Age appropriate norms for SLS: 60-68 y/o mean = 27.0 sec      70-80 y/o mean = 17.2 sec      80-98 y/o mean = 8.5 sec     Gait: pt ambulates on level ground with decreased step length; decreased foot clearance; decreased arm swing; stooped posture/forward lean; path deviation with visual scanning (varied crossover gait pattern) no A.D.    Timed Up and Go (AD,time): 12 sec; Fall Risk: No  [Performance exceeding the upper limit of confidence intervals are considered increased risk for falls; Theodore, 2006...   60-68 y/o mean 8.1s (7.1-9.0s)   70-80 y/o mean 9.2s (8.2-10.2s)   80-98 y/o mean 11.3s  (10.0-12.7s)]    5x Sit -->Stand: 12 sec; Fall Risk: No    Today's Treatment and Response:   Pt education was provided on exam findings, treatment diagnosis, treatment plan, expectations, and prognosis.  Today's Treatment       2/4/2025   PT Treatment   Therapeutic Exercise Hooklying hip abd jair YTB 5\" X 10 with instruction in option for seated for HEP  Seated alt LE march X 5 ea  Seated LAQ X 5 ea  Seated alt heel-toe raise X 5 ea  Education: keep PT posted on response to visits to ensure proper activity pacing   Therapeutic Exercise Min 10   Evaluation Min 30   Total of Timed Procedures 10   Total of Service Based 30   Total Treatment Time 40         Patient was instructed in and issued a HEP for: Access Code: [email or web address]  URL: [email or web address]  Date: 02/04/2025  Prepared by: Cahrito Honeycutt    Exercises  - Seated March  - 1 x daily - 3 x weekly - 2 sets - 10 reps - 1 sec hold  - Seated Heel Toe Raises  - 1 x daily - 3 x weekly - 2 sets - 10 reps - 1 sec hold  - Seated Long Arc Quad  - 1 x daily - 3 x weekly - 2 sets - 10 reps - 1 sec hold  - Seated Hip Abduction with Resistance  - 1 x daily - 3 x weekly - 1-2 sets - 10 reps - 5 sec hold  - Hooklying Clamshell with Resistance  - 1 x daily - 3 x weekly - 1-2 sets - 10 reps - 5 sec hold    Charges:  PT EVAL: Low Complexity, Therex X 1  In agreement with evaluation findings and clinical rationale, this evaluation involved LOW COMPLEXITY decision making due to no personal factors/comorbidities, 1-2 body structures involved/activity limitations, and stable symptoms as documented in the evaluation.                                                        PLAN OF CARE:    Goals: (to be met in 10 visits)   Goals       Therapy Goals      Not Met Progress Toward Partially Met Met   Pt will improve B hip abd strength to 4-/5 with the ability to walk & stand for at least 15 minutes for improved home & community participation such as grocery shopping, meal  preparation, washing dishes. [] [] [] []   Pt will improve B hip flex strength to ascend 1 flight of stairs with use of handrail for improved community negotiation. [] [] [] []   Pt will demonstrate improved SLS to >10 seconds ROMA to promote safety and decrease risk of falls on uneven surfaces such as grass and gravel. [] [] [] []   Pt will perform TUG in <10 seconds, demonstrating improved gait speed for improved community ambulation. [] [] [] []   Pt will be independent and compliant with comprehensive HEP to maintain progress achieved in PT. [] [] [] []                    Frequency / Duration: Patient will be seen 1-2x/week or a total of 10 visits over a 90 day period. Treatment will include: Gait training; Manual Therapy; Neuromuscular Re-education; Self-Care Home Management; Therapeutic Activities; Therapeutic Exercise; Home Exercise Program instruction    Education or treatment limitation: None   Rehab Potential: good     FES Score  Score: (Patient-Rptd) 75 % (2/4/2025  7:52 AM)    Score and level of concern: (Patient-Rptd) 48 - high concern (2/4/2025  7:52 AM)      Patient/Family/Caregiver was advised of these findings, precautions, and treatment options and has agreed to actively participate in planning and for this course of care.    Thank you for your referral. Please co-sign or sign and return this letter via fax as soon as possible to [phone]. If you have any questions, please contact me at Dept: [phone]    Sincerely,  Electronically signed by therapist: Charito Honeycutt, PT  Physician's certification required: Yes  I certify the need for these services furnished under this plan of treatment and while under my care.    X___________________________________________________ Date____________________    Certification From: 2/4/2025  To:5/5/2025

## 2025-02-04 NOTE — PATIENT INSTRUCTIONS
Access Code: [email or web address]  URL: [email or web address]  Date: 02/04/2025  Prepared by: Charito Honeycutt    Exercises  - Seated March  - 1 x daily - 3 x weekly - 2 sets - 10 reps - 1 sec hold  - Seated Heel Toe Raises  - 1 x daily - 3 x weekly - 2 sets - 10 reps - 1 sec hold  - Seated Long Arc Quad  - 1 x daily - 3 x weekly - 2 sets - 10 reps - 1 sec hold  - Seated Hip Abduction with Resistance  - 1 x daily - 3 x weekly - 1-2 sets - 10 reps - 5 sec hold  - Hooklying Clamshell with Resistance  - 1 x daily - 3 x weekly - 1-2 sets - 10 reps - 5 sec hold

## 2025-02-07 ENCOUNTER — OFFICE VISIT (OUTPATIENT)
Dept: PHYSICAL THERAPY | Age: 80
End: 2025-02-07
Attending: FAMILY MEDICINE
Payer: MEDICARE

## 2025-02-07 PROCEDURE — 97110 THERAPEUTIC EXERCISES: CPT | Performed by: PHYSICAL THERAPIST

## 2025-02-07 PROCEDURE — 97112 NEUROMUSCULAR REEDUCATION: CPT | Performed by: PHYSICAL THERAPIST

## 2025-02-07 NOTE — PATIENT INSTRUCTIONS
Access Code: [email or web address]  URL: [email or web address]  Date: 02/07/2025  Prepared by: Charito Honeycutt    Exercises  - Supine Diaphragmatic Breathing  - 1 x daily - 7 x weekly - 4-7 seconds hold - 4 seconds inhale - 8 seconds exhale - 3 min duration  - Seated Diaphragmatic Breathing  - 1 x daily - 7 x weekly - 4-7 sec hold - 4 sec inhale - 8 sec exhale - 3 min total duration

## 2025-02-11 ENCOUNTER — OFFICE VISIT (OUTPATIENT)
Dept: PHYSICAL THERAPY | Age: 80
End: 2025-02-11
Attending: FAMILY MEDICINE
Payer: MEDICARE

## 2025-02-11 PROCEDURE — 97110 THERAPEUTIC EXERCISES: CPT | Performed by: PHYSICAL THERAPIST

## 2025-02-11 NOTE — PROGRESS NOTES
Patient: Lb Johns (80 year old, female) Referring Provider:  Insurance:   Diagnosis: Physical deconditioning (R53.81)  Generalized weakness (R53.1) Caro HUANG [facility]   Date of Surgery: 1/14/2025 Next MD visit:  N/A   Precautions:  Hearing Impairment; Fall Risk; Visual Impairment none scheduled Referral Information:    Date of Evaluation: Req: 0, Auth: 0, Exp:     02/04/25 POC Auth Visits:  10       Today's Date   2/11/2025    Subjective  no pain or concerns, felt ok after last visit (good energy)       Pain: 0/10     Objective    Assessment  Able to progress with additional reps of exercises today including with advance to level 2 on NuStep. Able to complete sit<>stand from chair without use of HHA.    Goals (to be met in 10 visits)   Goals       Therapy Goals      Not Met Progress Toward Partially Met Met   Pt will improve B hip abd strength to 4-/5 with the ability to walk & stand for at least 15 minutes for improved home & community participation such as grocery shopping, meal preparation, washing dishes. [] [] [] []   Pt will improve B hip flex strength to ascend 1 flight of stairs with use of handrail for improved community negotiation. [] [] [] []   Pt will demonstrate improved SLS to >10 seconds ROMA to promote safety and decrease risk of falls on uneven surfaces such as grass and gravel. [] [] [] []   Pt will perform TUG in <10 seconds, demonstrating improved gait speed for improved community ambulation. [] [] [] []   Pt will be independent and compliant with comprehensive HEP to maintain progress achieved in PT. [] [] [] []                  Plan  Next: TA bracing; may update HEP next visit    Treatment Last 4 Visits       2/4/2025 2/7/2025 2/11/2025   PT Treatment   Treatment Day  2 3   Therapeutic Exercise Hooklying hip abd jair YTB 5\" X 10 with instruction in option for seated for HEP  Seated alt LE march X 5 ea  Seated LAQ X 5 ea  Seated alt heel-toe raise X 5 ea  Education: keep PT posted  on response to visits to ensure proper activity pacing HEP review  Education: keep PT posted re: response to TX to ensure proper pacing (energy levels, pain)  NuStep L1 X 5'  Supine SAQ round bolster 5\" X 10 ea  Seated hip abd jair YTB 5\" X 10  Seated hip add jair yellow ball 5\" X 10  Seated alt LE march X 10 ea  Seated alt LAQ X 10 ea  Seated B alt heel-toe raise X 20 ea  Standing 3-way hip X 15 ea R/L w/ HHA  Standing B B alt heel-toe raise X 10 ea w/ HHA NuStep L2 X 5'  HEP education: pt can complete supine exercises in bed, does not need to transfer to floor if uncomfortable or not feeling safe  Supine SAQ round bolster 5\" 2 X 10 ea  Supine hip abd jair YTB 5\" 2 X 10  Supine hip add jair yellow ball 5\" 2 X 10  Bridge 2 X 10  Clams 2 X 10 ea  Standing march 2 X 10 ea w/ HHA  Standing 3-way hip X 15 ea R/L w/ HHA  Standing B B alt heel-toe raise 2 X 10 ea w/ HHA  Mini squat X 10 w/ HHA  Lat walk in // bars 4 laps no HHA  Sit<>stand chair no HHA X 10   Neuro Re-Ed  SLB 5 X 15\" ea w/ fingertip assist  Feet together balance on airex foam pad 5 X 15\" no HHA  Supine Diaphragmatic Breathing 4-4-8 X 3'    Therapeutic Exercise Min 10 32 38   Neuro Re-Ed Min  10    Evaluation Min 30     Total of Timed Procedures 10 42 38   Total of Service Based 30 0 0   Total Treatment Time 40 42 38         HEP  Access Code: [email or web address]  URL: [email or web address]  Date: 02/07/2025  Prepared by: Charito Honeycutt    Exercises  - Supine Diaphragmatic Breathing  - 1 x daily - 7 x weekly - 4-7 seconds hold - 4 seconds inhale - 8 seconds exhale - 3 min duration  - Seated Diaphragmatic Breathing  - 1 x daily - 7 x weekly - 4-7 sec hold - 4 sec inhale - 8 sec exhale - 3 min total duration    Charges     Therex X 3

## 2025-02-14 ENCOUNTER — OFFICE VISIT (OUTPATIENT)
Dept: PHYSICAL THERAPY | Age: 80
End: 2025-02-14
Attending: FAMILY MEDICINE
Payer: MEDICARE

## 2025-02-14 PROCEDURE — 97110 THERAPEUTIC EXERCISES: CPT | Performed by: PHYSICAL THERAPIST

## 2025-02-14 NOTE — PROGRESS NOTES
Patient: Lb Johns (80 year old, female) Referring Provider:  Insurance:   Diagnosis: Physical deconditioning (R53.81)  Generalized weakness (R53.1) Caro [place]  RE[facility]   Date of Surgery: 1/14/2025 Next MD visit:  N/A   Precautions:  Hearing Impairment; Fall Risk; Visual Impairment none scheduled Referral Information:    Date of Evaluation: Req: 0, Auth: 0, Exp:     02/04/25 POC Auth Visits:  10       Today's Date   2/14/2025    Subjective  no pain, went to the aerobic swimming at her [place] [employer] for about 75 min - did not feel fatigued       Pain: 0/10     Objective    Assessment  Pt able to cont with activity progressions with increased TheraBand resistance. No pain with TX with appropriate sufficient fatigue noted.    Goals (to be met in 10 visits)   Goals       Therapy Goals      Not Met Progress Toward Partially Met Met   Pt will improve B hip abd strength to 4-/5 with the ability to walk & stand for at least 15 minutes for improved home & community participation such as grocery shopping, meal preparation, washing dishes. [] [] [] []   Pt will improve B hip flex strength to ascend 1 flight of stairs with use of handrail for improved community negotiation. [] [] [] []   Pt will demonstrate improved SLS to >10 seconds ROMA to promote safety and decrease risk of falls on uneven surfaces such as grass and gravel. [] [] [] []   Pt will perform TUG in <10 seconds, demonstrating improved gait speed for improved community ambulation. [] [] [] []   Pt will be independent and compliant with comprehensive HEP to maintain progress achieved in PT. [] [] [] []                  Plan  Next: TA bracing; may update HEP next visit    Treatment Last 4 Visits       2/4/2025 2/7/2025 2/11/2025 2/14/2025   PT Treatment   Treatment Day  2 3 4   Therapeutic Exercise Hooklying hip abd jair YTB 5\" X 10 with instruction in option for seated for HEP  Seated alt LE march X 5 ea  Seated LAQ X 5 ea  Seated alt heel-toe raise X 5  ea  Education: keep PT posted on response to visits to ensure proper activity pacing HEP review  Education: keep PT posted re: response to TX to ensure proper pacing (energy levels, pain)  NuStep L1 X 5'  Supine SAQ round bolster 5\" X 10 ea  Seated hip abd jair YTB 5\" X 10  Seated hip add jair yellow ball 5\" X 10  Seated alt LE march X 10 ea  Seated alt LAQ X 10 ea  Seated B alt heel-toe raise X 20 ea  Standing 3-way hip X 15 ea R/L w/ HHA  Standing B B alt heel-toe raise X 10 ea w/ HHA NuStep L2 X 5'  HEP education: pt can complete supine exercises in bed, does not need to transfer to floor if uncomfortable or not feeling safe  Supine SAQ round bolster 5\" 2 X 10 ea  Supine hip abd jair YTB 5\" 2 X 10  Supine hip add jair yellow ball 5\" 2 X 10  Bridge 2 X 10  Clams 2 X 10 ea  Standing march 2 X 10 ea w/ HHA  Standing 3-way hip X 15 ea R/L w/ HHA  Standing B B alt heel-toe raise 2 X 10 ea w/ HHA  Mini squat X 10 w/ HHA  Lat walk in // bars 4 laps no HHA  Sit<>stand chair no HHA X 10 NuStep L3 X 6'  Supine hip abd jair RTB 5\" 3 X 10  SB LTR X 10 ea  Supine bridge 3 X 10  SB DKTC 3 X 10  Clams 2 X 10 ea  Reverse clams 2 X 10 ea  SLR 2 X 10 ea  Standing 3-way hip X 15 ea R/L w/ HHA RTB  Lat walk in // bars 4 laps no HHA RTB  Fwd MW in // bars 4 laps w/ HHA RTB     Neuro Re-Ed  SLB 5 X 15\" ea w/ fingertip assist  Feet together balance on airex foam pad 5 X 15\" no HHA  Supine Diaphragmatic Breathing 4-4-8 X 3'     Therapeutic Exercise Min 10 32 38 40   Neuro Re-Ed Min  10     Evaluation Min 30      Total of Timed Procedures 10 42 38 40   Total of Service Based 30 0 0 0   Total Treatment Time 40 42 38 40         HEP  Access Code: [email or web address]  URL: [email or web address]  Date: 02/07/2025  Prepared by: Charito Honeycutt    Exercises  - Supine Diaphragmatic Breathing  - 1 x daily - 7 x weekly - 4-7 seconds hold - 4 seconds inhale - 8 seconds exhale - 3 min duration  - Seated Diaphragmatic Breathing  - 1 x daily - 7 x  weekly - 4-7 sec hold - 4 sec inhale - 8 sec exhale - 3 min total duration    Charges     Therex X 3

## 2025-02-18 ENCOUNTER — TELEPHONE (OUTPATIENT)
Dept: PHYSICAL THERAPY | Facility: HOSPITAL | Age: 80
End: 2025-02-18

## 2025-02-18 ENCOUNTER — OFFICE VISIT (OUTPATIENT)
Dept: PHYSICAL THERAPY | Age: 80
End: 2025-02-18
Attending: FAMILY MEDICINE
Payer: MEDICARE

## 2025-02-18 PROCEDURE — 97112 NEUROMUSCULAR REEDUCATION: CPT | Performed by: PHYSICAL THERAPIST

## 2025-02-18 PROCEDURE — 97110 THERAPEUTIC EXERCISES: CPT | Performed by: PHYSICAL THERAPIST

## 2025-02-18 NOTE — PROGRESS NOTES
Patient: Lb Johns (80 year old, female) Referring Provider:  Insurance:   Diagnosis: Physical deconditioning (R53.81)  Generalized weakness (R53.1) Caro HUANG [facility]   Date of Surgery: 1/14/2025 Next MD visit:  N/A   Precautions:  Hearing Impairment; Fall Risk; Visual Impairment none scheduled Referral Information:    Date of Evaluation: Req: 0, Auth: 0, Exp:     02/04/25 POC Auth Visits:  10       Today's Date   2/18/2025    Subjective  pt reports no pain but did not have the motivation this weekend to do more after running errand Fri       Pain: 0/10     Objective  see flowsheet for details    Assessment  Progressed with abdominal bracing. Pt able to demo good form upon cues. HEP updated to reflect progressions.    Goals (to be met in 10 visits)   Goals       Therapy Goals      Not Met Progress Toward Partially Met Met   Pt will improve B hip abd strength to 4-/5 with the ability to walk & stand for at least 15 minutes for improved home & community participation such as grocery shopping, meal preparation, washing dishes. [] [] [] []   Pt will improve B hip flex strength to ascend 1 flight of stairs with use of handrail for improved community negotiation. [] [] [] []   Pt will demonstrate improved SLS to >10 seconds ROMA to promote safety and decrease risk of falls on uneven surfaces such as grass and gravel. [] [] [] []   Pt will perform TUG in <10 seconds, demonstrating improved gait speed for improved community ambulation. [] [] [] []   Pt will be independent and compliant with comprehensive HEP to maintain progress achieved in PT. [] [] [] []                  Plan  Cont with balance progressions as indicated.  Pt is aware PT will be out of office from 2/20 & returning 2/26 & will contact central scheduling/ PFN direct line if any issues arise during this time. Next appointment confirmed.    Treatment Last 4 Visits       2/7/2025 2/11/2025 2/14/2025 2/18/2025   PT Treatment   Treatment Day 2 3 4 5    Therapeutic Exercise HEP review  Education: keep PT posted re: response to TX to ensure proper pacing (energy levels, pain)  NuStep L1 X 5'  Supine SAQ round bolster 5\" X 10 ea  Seated hip abd jair YTB 5\" X 10  Seated hip add jair yellow ball 5\" X 10  Seated alt LE march X 10 ea  Seated alt LAQ X 10 ea  Seated B alt heel-toe raise X 20 ea  Standing 3-way hip X 15 ea R/L w/ HHA  Standing B B alt heel-toe raise X 10 ea w/ HHA NuStep L2 X 5'  HEP education: pt can complete supine exercises in bed, does not need to transfer to floor if uncomfortable or not feeling safe  Supine SAQ round bolster 5\" 2 X 10 ea  Supine hip abd jair YTB 5\" 2 X 10  Supine hip add jair yellow ball 5\" 2 X 10  Bridge 2 X 10  Clams 2 X 10 ea  Standing march 2 X 10 ea w/ HHA  Standing 3-way hip X 15 ea R/L w/ HHA  Standing B B alt heel-toe raise 2 X 10 ea w/ HHA  Mini squat X 10 w/ HHA  Lat walk in // bars 4 laps no HHA  Sit<>stand chair no HHA X 10 NuStep L3 X 6'  Supine hip abd jair RTB 5\" 3 X 10  SB LTR X 10 ea  Supine bridge 3 X 10  SB DKTC 3 X 10  Clams 2 X 10 ea  Reverse clams 2 X 10 ea  SLR 2 X 10 ea  Standing 3-way hip X 15 ea R/L w/ HHA RTB  Lat walk in // bars 4 laps no HHA RTB  Fwd MW in // bars 4 laps w/ HHA RTB   NuStep L3 X 6'  SLR 2 X 10 ea  Supine bridge 3 X 10  Clams 3 X 10 ea  Reverse clams 3 X 10 ea  Standing march 2 X 10 ea w/ HHA on airex foam  Standing 3-way hip X 20 ea R/L w/ HHA RTB  Lat walk in // bars 4 laps no HHA RTB   Neuro Re-Ed SLB 5 X 15\" ea w/ fingertip assist  Feet together balance on airex foam pad 5 X 15\" no HHA  Supine Diaphragmatic Breathing 4-4-8 X 3'   Supine Diaphragmatic Breathing 4-4-8 X 3'  Supine TA bracing 3 X 10   Therapeutic Exercise Min 32 38 40 35   Neuro Re-Ed Min 10   10   Total of Timed Procedures 42 38 40 45   Total of Service Based 0 0 0 0   Total Treatment Time 42 38 40 45         HEP  Access Code: [email or web address]  URL: [email or web address]  Date: 02/18/2025  Prepared by: Charito  Tangela    Exercises  - Active Straight Leg Raise with Quad Set  - 1 x daily - 3 x weekly - 2 sets - 10 reps  - Sidelying Reverse Clamshell  - 1 x daily - 3 x weekly - 3 sets - 10 reps  - Clamshell  - 1 x daily - 3 x weekly - 3 sets - 10 reps  - Beginner Bridge  - 1 x daily - 3 x weekly - 3 sets - 10 reps  - Supine Transversus Abdominis Bracing - Hands on Stomach  - 1 x daily - 3 x weekly - 3 sets - 10 reps    Charges     Therex X 2, Neuro X 1

## 2025-02-18 NOTE — PATIENT INSTRUCTIONS
Access Code: [email or web address]  URL: [email or web address]  Date: 02/18/2025  Prepared by: Charito Honeycutt    Exercises  - Active Straight Leg Raise with Quad Set  - 1 x daily - 3 x weekly - 2 sets - 10 reps  - Sidelying Reverse Clamshell  - 1 x daily - 3 x weekly - 3 sets - 10 reps  - Clamshell  - 1 x daily - 3 x weekly - 3 sets - 10 reps  - Beginner Bridge  - 1 x daily - 3 x weekly - 3 sets - 10 reps  - Supine Transversus Abdominis Bracing - Hands on Stomach  - 1 x daily - 3 x weekly - 3 sets - 10 reps

## 2025-02-25 ENCOUNTER — OFFICE VISIT (OUTPATIENT)
Dept: PHYSICAL THERAPY | Age: 80
End: 2025-02-25
Attending: FAMILY MEDICINE
Payer: MEDICARE

## 2025-02-25 PROCEDURE — 97110 THERAPEUTIC EXERCISES: CPT

## 2025-02-25 PROCEDURE — 97112 NEUROMUSCULAR REEDUCATION: CPT

## 2025-02-25 NOTE — PROGRESS NOTES
Patient: Lb Johns (80 year old, female) Referring Provider:  Insurance:   Diagnosis: Physical deconditioning (R53.81)  Generalized weakness (R53.1) Caro HUANG [facility]   Date of Surgery: 1/14/2025 Next MD visit:  N/A   Precautions:  Hearing Impairment; Fall Risk; Visual Impairment none scheduled Referral Information:    Date of Evaluation: Req: 0, Auth: 0, Exp:     02/04/25 POC Auth Visits:  10       Today's Date   2/25/2025    Subjective  \"No pain, just still having balance issues.\"       Pain: 0/10     Objective  See flowsheet for details       Assessment  Secondary to Pt arriving >10 mins late, POC was modified.  Focused on LE strengthening and balance/proprioception exercises.  Pt fatigued quickly with all LE strengthening exercises.  Pt demonstrated significant instability during completion of balance exercises.  Will cont to progress POC per primary therapist.    Goals (to be met in 10 visits)   Goals       Therapy Goals      Not Met Progress Toward Partially Met Met   Pt will improve B hip abd strength to 4-/5 with the ability to walk & stand for at least 15 minutes for improved home & community participation such as grocery shopping, meal preparation, washing dishes. [] [] [] []   Pt will improve B hip flex strength to ascend 1 flight of stairs with use of handrail for improved community negotiation. [] [] [] []   Pt will demonstrate improved SLS to >10 seconds ROMA to promote safety and decrease risk of falls on uneven surfaces such as grass and gravel. [] [] [] []   Pt will perform TUG in <10 seconds, demonstrating improved gait speed for improved community ambulation. [] [] [] []   Pt will be independent and compliant with comprehensive HEP to maintain progress achieved in PT. [] [] [] []                  Plan  Cont with balance progressions as indicated.    Treatment Last 4 Visits       2/11/2025 2/14/2025 2/18/2025 2/25/2025   PT Treatment   Treatment Day 3 4 5 6   Therapeutic Exercise NuStep  L2 X 5'  HEP education: pt can complete supine exercises in bed, does not need to transfer to floor if uncomfortable or not feeling safe  Supine SAQ round bolster 5\" 2 X 10 ea  Supine hip abd jair YTB 5\" 2 X 10  Supine hip add jair yellow ball 5\" 2 X 10  Bridge 2 X 10  Clams 2 X 10 ea  Standing march 2 X 10 ea w/ HHA  Standing 3-way hip X 15 ea R/L w/ HHA  Standing B B alt heel-toe raise 2 X 10 ea w/ HHA  Mini squat X 10 w/ HHA  Lat walk in // bars 4 laps no HHA  Sit<>stand chair no HHA X 10 NuStep L3 X 6'  Supine hip abd jair RTB 5\" 3 X 10  SB LTR X 10 ea  Supine bridge 3 X 10  SB DKTC 3 X 10  Clams 2 X 10 ea  Reverse clams 2 X 10 ea  SLR 2 X 10 ea  Standing 3-way hip X 15 ea R/L w/ HHA RTB  Lat walk in // bars 4 laps no HHA RTB  Fwd MW in // bars 4 laps w/ HHA RTB   NuStep L3 X 6'  SLR 2 X 10 ea  Supine bridge 3 X 10  Clams 3 X 10 ea  Reverse clams 3 X 10 ea  Standing march 2 X 10 ea w/ HHA on airex foam  Standing 3-way hip X 20 ea R/L w/ HHA RTB  Lat walk in // bars 4 laps no HHA RTB NuStep (L5) 6 mins  Standing Heel/Toe Raises 3x10 ea  Standing Hip Abd 2x10 ea  Sidestepping in //Bars x5 laps  Seated Ball Squeezes 20x5 secs  Seated Hip Abd w/ Ring 20x5 secs  Seated March 2x10 ea 1.5#  LAQ 2x10 ea 1.5#   Neuro Re-Ed   Supine Diaphragmatic Breathing 4-4-8 X 3'  Supine TA bracing 3 X 10 Standing March on Airex 2x10 with CGA  Balance on Airex - Feet Together 3x30 secs  Tandem Balance on Floor 2x30 secs ea  Tandem Walking in //Bars x2 laps   Therapeutic Exercise Min 38 40 35 18   Neuro Re-Ed Min   10 15   Total of Timed Procedures 38 40 45 33   Total of Service Based 0 0 0 0   Total Treatment Time 38 40 45 33         HEP  Access Code: [email or web address]  URL: [email or web address]  Date: 02/18/2025  Prepared by: Charito Honeycutt    Exercises  - Active Straight Leg Raise with Quad Set  - 1 x daily - 3 x weekly - 2 sets - 10 reps  - Sidelying Reverse Clamshell  - 1 x daily - 3 x weekly - 3 sets - 10 reps  - Clamshell   - 1 x daily - 3 x weekly - 3 sets - 10 reps  - Beginner Bridge  - 1 x daily - 3 x weekly - 3 sets - 10 reps  - Supine Transversus Abdominis Bracing - Hands on Stomach  - 1 x daily - 3 x weekly - 3 sets - 10 reps    Charges     Therapeutic Ex x1, Neuromuscular Re-ed x1

## 2025-02-27 ENCOUNTER — OFFICE VISIT (OUTPATIENT)
Dept: PHYSICAL THERAPY | Age: 80
End: 2025-02-27
Attending: FAMILY MEDICINE
Payer: MEDICARE

## 2025-02-27 PROCEDURE — 97112 NEUROMUSCULAR REEDUCATION: CPT | Performed by: PHYSICAL THERAPIST

## 2025-02-27 PROCEDURE — 97110 THERAPEUTIC EXERCISES: CPT | Performed by: PHYSICAL THERAPIST

## 2025-02-27 NOTE — PROGRESS NOTES
Patient: Lb Johns (80 year old, female) Referring Provider:  Insurance:   Diagnosis: Physical deconditioning (R53.81)  Generalized weakness (R53.1) Caro EDMONDS [facility]  REINA [facility]   Date of Surgery: 1/14/2025 Next MD visit:  N/A   Precautions:  Hearing Impairment; Fall Risk; Visual Impairment none scheduled Referral Information:    Date of Evaluation: Req: 0, Auth: 0, Exp:     02/04/25 POC Auth Visits:  10       Today's Date   2/27/2025    Subjective  pt reports she did her walk today outside for about 10', did feel some issue with balance though no falls       Pain: 0/10     Objective  See flowsheet for details    Assessment  Pt able to cont with increased balance & strength progressions, without req any rest breaks in visit (despite being offered), pt able to advance to green theraband with appropriate challenge noted. Pt appeared visually fatigued though no pain noted. Req close guarding with balance activity for safety with stability ex's being most challenging for pt today.    Goals (to be met in 10 visits)   Goals       Therapy Goals      Not Met Progress Toward Partially Met Met   Pt will improve B hip abd strength to 4-/5 with the ability to walk & stand for at least 15 minutes for improved home & community participation such as grocery shopping, meal preparation, washing dishes. [] [] [] []   Pt will improve B hip flex strength to ascend 1 flight of stairs with use of handrail for improved community negotiation. [] [] [] []   Pt will demonstrate improved SLS to >10 seconds ROMA to promote safety and decrease risk of falls on uneven surfaces such as grass and gravel. [] [] [] []   Pt will perform TUG in <10 seconds, demonstrating improved gait speed for improved community ambulation. [] [] [] []   Pt will be independent and compliant with comprehensive HEP to maintain progress achieved in PT. [] [] [] []                  Plan  Cont with balance progressions as indicated.    Treatment Last 4 Visits        2/14/2025 2/18/2025 2/25/2025 2/27/2025   PT Treatment   Treatment Day 4 5 6 7   Therapeutic Exercise NuStep L3 X 6'  Supine hip abd jair RTB 5\" 3 X 10  SB LTR X 10 ea  Supine bridge 3 X 10  SB DKTC 3 X 10  Clams 2 X 10 ea  Reverse clams 2 X 10 ea  SLR 2 X 10 ea  Standing 3-way hip X 15 ea R/L w/ HHA RTB  Lat walk in // bars 4 laps no HHA RTB  Fwd MW in // bars 4 laps w/ HHA RTB   NuStep L3 X 6'  SLR 2 X 10 ea  Supine bridge 3 X 10  Clams 3 X 10 ea  Reverse clams 3 X 10 ea  Standing march 2 X 10 ea w/ HHA on airex foam  Standing 3-way hip X 20 ea R/L w/ HHA RTB  Lat walk in // bars 4 laps no HHA RTB NuStep (L5) 6 mins  Standing Heel/Toe Raises 3x10 ea  Standing Hip Abd 2x10 ea  Sidestepping in //Bars x5 laps  Seated Ball Squeezes 20x5 secs  Seated Hip Abd w/ Ring 20x5 secs  Seated March 2x10 ea 1.5#  LAQ 2x10 ea 1.5# NuStep L5 X 6'  Slantboard gastroc stretch static X 2' B  Lat walk in // bars 6 laps no HHA Green TB  Fwd MW in // bars 6 laps w/ HHA Green TB  Standing 3-way hip X 20 ea R/L w/ HHA Green TB  Seated Ball Squeezes 20x5 secs  Seated Hip Abd w/ Ring 20x5 secs  Seated March 2x10 ea 1.5#  LAQ 2x10 ea 1.5#  Standing Heel/Toe Raises 3x10 ea 1.5#  Standing mini squat X 15   Neuro Re-Ed  Supine Diaphragmatic Breathing 4-4-8 X 3'  Supine TA bracing 3 X 10 Standing March on Airex 2x10 with CGA  Balance on Airex - Feet Together 3x30 secs  Tandem Balance on Floor 2x30 secs ea  Tandem Walking in //Bars x2 laps Standing March on Airex 2x10 with CGA  Balance on Airex - Feet Together 3x30 secs  Tandem Balance on Floor 2x30 secs ea  Tandem Walking in //Bars x4 laps   Therapeutic Exercise Min 40 35 18 28   Neuro Re-Ed Min  10 15 10   Total of Timed Procedures 40 45 33 38   Total of Service Based 0 0 0 0   Total Treatment Time 40 45 33 38         HEP  Access Code: [email or web address]  URL: [email or web address]  Date: 02/18/2025  Prepared by: Charito Honeycutt    Exercises  - Active Straight Leg Raise with Quad Set  - 1 x  daily - 3 x weekly - 2 sets - 10 reps  - Sidelying Reverse Clamshell  - 1 x daily - 3 x weekly - 3 sets - 10 reps  - Clamshell  - 1 x daily - 3 x weekly - 3 sets - 10 reps  - Beginner Bridge  - 1 x daily - 3 x weekly - 3 sets - 10 reps  - Supine Transversus Abdominis Bracing - Hands on Stomach  - 1 x daily - 3 x weekly - 3 sets - 10 reps    Charges     Therex X 2, Neuro X 1

## 2025-03-03 ENCOUNTER — OFFICE VISIT (OUTPATIENT)
Dept: PHYSICAL THERAPY | Age: 80
End: 2025-03-03
Attending: FAMILY MEDICINE
Payer: MEDICARE

## 2025-03-03 PROCEDURE — 97112 NEUROMUSCULAR REEDUCATION: CPT | Performed by: PHYSICAL THERAPIST

## 2025-03-03 PROCEDURE — 97110 THERAPEUTIC EXERCISES: CPT | Performed by: PHYSICAL THERAPIST

## 2025-03-03 NOTE — PROGRESS NOTES
Patient: Lb Johns (80 year old, female) Referring Provider:  Insurance:   Diagnosis: Physical deconditioning (R53.81)  Generalized weakness (R53.1) Carosamantha HUANG [facility]   Date of Surgery: 1/14/2025 Next MD visit:  N/A   Precautions:  Hearing Impairment; Fall Risk; Visual Impairment none scheduled Referral Information:    Date of Evaluation: Req: 0, Auth: 0, Exp:     02/04/25 POC Auth Visits:  10       Today's Date   3/3/2025    Subjective  pt able to go in the water > 1 hr; feels her balance is the biggest issue still - with walking, & with getting up in the morning if lights aren't on       Pain:   NT     Objective  See flowsheet for details     Assessment  Emphasis on balance progressions including more dynamic activities. Pt steadily improving. Challenged with eyes closed. Pt req between SBA to CGA with all balance activity for safety.    Goals (to be met in 10 visits)   Goals       Therapy Goals      Not Met Progress Toward Partially Met Met   Pt will improve B hip abd strength to 4-/5 with the ability to walk & stand for at least 15 minutes for improved home & community participation such as grocery shopping, meal preparation, washing dishes. [] [] [] []   Pt will improve B hip flex strength to ascend 1 flight of stairs with use of handrail for improved community negotiation. [] [] [] []   Pt will demonstrate improved SLS to >10 seconds ROMA to promote safety and decrease risk of falls on uneven surfaces such as grass and gravel. [] [] [] []   Pt will perform TUG in <10 seconds, demonstrating improved gait speed for improved community ambulation. [] [] [] []   Pt will be independent and compliant with comprehensive HEP to maintain progress achieved in PT. [] [] [] []                  Plan  Cont with balance progressions as indicated.    Treatment Last 4 Visits       2/18/2025 2/25/2025 2/27/2025 3/3/2025   PT Treatment   Treatment Day 5 6 7 8   Therapeutic Exercise NuStep L3 X 6'  SLR 2 X 10  ea  Supine bridge 3 X 10  Clams 3 X 10 ea  Reverse clams 3 X 10 ea  Standing march 2 X 10 ea w/ HHA on airex foam  Standing 3-way hip X 20 ea R/L w/ HHA RTB  Lat walk in // bars 4 laps no HHA RTB NuStep (L5) 6 mins  Standing Heel/Toe Raises 3x10 ea  Standing Hip Abd 2x10 ea  Sidestepping in //Bars x5 laps  Seated Ball Squeezes 20x5 secs  Seated Hip Abd w/ Ring 20x5 secs  Seated March 2x10 ea 1.5#  LAQ 2x10 ea 1.5# NuStep L5 X 6'  Slantboard gastroc stretch static X 2' B  Lat walk in // bars 6 laps no HHA Green TB  Fwd MW in // bars 6 laps w/ HHA Green TB  Standing 3-way hip X 20 ea R/L w/ HHA Green TB  Seated Ball Squeezes 20x5 secs  Seated Hip Abd w/ Ring 20x5 secs  Seated March 2x10 ea 1.5#  LAQ 2x10 ea 1.5#  Standing Heel/Toe Raises 3x10 ea 1.5#  Standing mini squat X 15 NuStep L6 X 6'  Slantboard gastroc stretch static X 2' B  Standing mini squat 2 X 10  Seated March 2x10 ea 1.5#  LAQ 2x10 ea 1.5#   Neuro Re-Ed Supine Diaphragmatic Breathing 4-4-8 X 3'  Supine TA bracing 3 X 10 Standing March on Airex 2x10 with CGA  Balance on Airex - Feet Together 3x30 secs  Tandem Balance on Floor 2x30 secs ea  Tandem Walking in //Bars x2 laps Standing March on Airex 2x10 with CGA  Balance on Airex - Feet Together 3x30 secs  Tandem Balance on Floor 2x30 secs ea  Tandem Walking in //Bars x4 laps Standing March on Airex 2x10 with SBA/ CGA  Balance on Airex - Feet Together 3x30 secs  Tandem Balance on Airex 2x30 secs ea  Tandem Walking in //Bars x6 laps on Airex balance beam  Lateral Walking in //Bars x4 laps on Airex balance beam  Romberg Stance with Eyes Closed 3X30\"  Walk with head turns R/L in // //bars X4  Obstacle course in //bars X6  Cone tapping: diagonal & cross body X 10 ea   Therapeutic Exercise Min 35 18 28 14   Neuro Re-Ed Min 10 15 10 24   Total of Timed Procedures 45 33 38 38   Total of Service Based 0 0 0 0   Total Treatment Time 45 33 38 38         HEP  Access Code: [email or web address]  URL:  [email or web address]  Date: 02/18/2025  Prepared by: Charito Honeycutt    Exercises  - Active Straight Leg Raise with Quad Set  - 1 x daily - 3 x weekly - 2 sets - 10 reps  - Sidelying Reverse Clamshell  - 1 x daily - 3 x weekly - 3 sets - 10 reps  - Clamshell  - 1 x daily - 3 x weekly - 3 sets - 10 reps  - Beginner Bridge  - 1 x daily - 3 x weekly - 3 sets - 10 reps  - Supine Transversus Abdominis Bracing - Hands on Stomach  - 1 x daily - 3 x weekly - 3 sets - 10 reps    Charges     Therex X 1, Neuro X 2

## 2025-03-05 ENCOUNTER — OFFICE VISIT (OUTPATIENT)
Dept: PHYSICAL THERAPY | Age: 80
End: 2025-03-05
Attending: FAMILY MEDICINE
Payer: MEDICARE

## 2025-03-05 PROCEDURE — 97112 NEUROMUSCULAR REEDUCATION: CPT | Performed by: PHYSICAL THERAPIST

## 2025-03-05 PROCEDURE — 97110 THERAPEUTIC EXERCISES: CPT | Performed by: PHYSICAL THERAPIST

## 2025-03-05 NOTE — PROGRESS NOTES
Patient: Lb Johns (80 year old, female) Referring Provider:  Insurance:   Diagnosis: Physical deconditioning (R53.81)  Generalized weakness (R53.1) Caro HUANG [facility]   Date of Surgery: 1/14/2025 Next MD visit:  N/A   Precautions:  Hearing Impairment; Fall Risk; Visual Impairment none scheduled Referral Information:    Date of Evaluation: Req: 0, Auth: 0, Exp:     02/04/25 POC Auth Visits:  10       Today's Date   3/5/2025    Subjective  pt went swimming again, did well after last visit; wanting some balance exercises for home; with the weather has yet to walk a more considerable length of time       Pain: 0/10     Objective  See flowsheet for details       30 sec sit<>stand test 13; Timed Up and Go (AD,time): 10 sec; Fall Risk: No     Assessment  Cont with balance activities with HEP updated accordingly for appropriate & safe exercises to complete for home. Emphasized pt safety at home with ability to have HHA to counter top surface & firm stable chain behind her when she is completing standing activities. Pt verbalized understanding. No pain or adverse symptoms noted throughout or at end of visit.    Goals (to be met in 10 visits)    Goals       Therapy Goals      Not Met Progress Toward Partially Met Met   Pt will improve B hip abd strength to 4-/5 with the ability to walk & stand for at least 15 minutes for improved home & community participation such as grocery shopping, meal preparation, washing dishes. [] [] [] []   Pt will improve B hip flex strength to ascend 1 flight of stairs with use of handrail for improved community negotiation. [] [] [] []   Pt will demonstrate improved SLS to >10 seconds ROMA to promote safety and decrease risk of falls on uneven surfaces such as grass and gravel. [] [] [] []   Pt will perform TUG in <10 seconds, demonstrating improved gait speed for improved community ambulation. [] [] [] []   Pt will be independent and compliant with comprehensive HEP to maintain  progress achieved in PT. [] [] [] []                   Plan  Pt to self-assess before next visit CLOF: will require Progress Note next visit for re-assessment.    Treatment Last 4 Visits       2/25/2025 2/27/2025 3/3/2025 3/5/2025   PT Treatment   Treatment Day 6 7 8 9   Therapeutic Exercise NuStep (L5) 6 mins  Standing Heel/Toe Raises 3x10 ea  Standing Hip Abd 2x10 ea  Sidestepping in //Bars x5 laps  Seated Ball Squeezes 20x5 secs  Seated Hip Abd w/ Ring 20x5 secs  Seated March 2x10 ea 1.5#  LAQ 2x10 ea 1.5# NuStep L5 X 6'  Slantboard gastroc stretch static X 2' B  Lat walk in // bars 6 laps no HHA Green TB  Fwd MW in // bars 6 laps w/ HHA Green TB  Standing 3-way hip X 20 ea R/L w/ HHA Green TB  Seated Ball Squeezes 20x5 secs  Seated Hip Abd w/ Ring 20x5 secs  Seated March 2x10 ea 1.5#  LAQ 2x10 ea 1.5#  Standing Heel/Toe Raises 3x10 ea 1.5#  Standing mini squat X 15 NuStep L6 X 6'  Slantboard gastroc stretch static X 2' B  Standing mini squat 2 X 10  Seated March 2x10 ea 1.5#  LAQ 2x10 ea 1.5#    Neuro Re-Ed Standing March on Airex 2x10 with CGA  Balance on Airex - Feet Together 3x30 secs  Tandem Balance on Floor 2x30 secs ea  Tandem Walking in //Bars x2 laps Standing March on Airex 2x10 with CGA  Balance on Airex - Feet Together 3x30 secs  Tandem Balance on Floor 2x30 secs ea  Tandem Walking in //Bars x4 laps Standing March on Airex 2x10 with SBA/ CGA  Balance on Airex - Feet Together 3x30 secs  Tandem Balance on Airex 2x30 secs ea  Tandem Walking in //Bars x6 laps on Airex balance beam  Lateral Walking in //Bars x4 laps on Airex balance beam  Romberg Stance with Eyes Closed 3X30\"  Walk with head turns R/L in // //bars X4  Obstacle course in //bars X6  Cone tapping: diagonal & cross body X 10 ea    Therapeutic Exercise Min 18 28 14    Neuro Re-Ed Min 15 10 24    Total of Timed Procedures 33 38 38    Total of Service Based 0 0 0    Total Treatment Time 33 38 38           2/25/2025 2/27/2025 3/3/2025 3/5/2025    Neuro Treatment   Treatment Day 6 7 8 9   Therapeutic Exercise    Standing Heel/Toe Raises 3x10 ea  Standing mini squat 2 X 10  HEP update: recommended parameters. Focus on safety.  Pt instructions: self-assess CLOF before next visit (for PN)   Neuro Re-Education    30 sec sit<>stand no HHA; X 2  TUG; X 5  Walk with head turns R/L in // //bars X4  Walk with head turns up-down in // //bars X4  Tandem Walking in //Bars x6 laps on Airex balance beam  Lateral Walking in //Bars x6 laps on Airex balance beam  Romberg Stance with Eyes Closed 4X30\"   Therapeutic Exercise Minutes    16   Neuro Re-Educ Minutes    24   Total Time Of Timed Procedures    40   Total Time Of Service-Based Procedures    0   Total Treatment Time    40   HEP    Access Code: [email or web address]  URL: [email or web address]  Date: 03/05/2025  Prepared by: Charito Honeycutt    Program Notes  Do not complete any standing/ balance exercises if you are feeling dizzy, too tired, weak, in pain, or off-balanced. Have a firm stable chair behind you to sit. Have a counter top surface in front of to hold on to.    Exercises  - Heel Toe Raises with Counter Support  - 1 x daily - 3 x weekly - 3 sets - 10 reps  - Standing Tandem Balance with Counter Support  - 1 x daily - 3 x weekly - 3 sets - 30 sec hold  - Narrow Stance with Counter Support  - 1 x daily - 3 x weekly - 3 sets - 1 reps - 30 sec hold  - Standing March with Counter Support  - 1 x daily - 3 x weekly - 2 sets - 10 reps  - Tandem Walking with Counter Support  - 1 x daily - 3 x weekly - 6 reps  - Mini Squat with Counter Support  - 1 x daily - 3 x weekly - 3 sets - 10 reps        HEP  Access Code: [email or web address]  URL: [email or web address]  Date: 03/05/2025  Prepared by: Charito Honeycutt    Program Notes  Do not complete any standing/ balance exercises if you are feeling dizzy, too tired, weak, in pain, or off-balanced. Have a firm stable chair behind you to sit. Have a counter top surface in  front of to hold on to.    Exercises  - Heel Toe Raises with Counter Support  - 1 x daily - 3 x weekly - 3 sets - 10 reps  - Standing Tandem Balance with Counter Support  - 1 x daily - 3 x weekly - 3 sets - 30 sec hold  - Narrow Stance with Counter Support  - 1 x daily - 3 x weekly - 3 sets - 1 reps - 30 sec hold  - Standing March with Counter Support  - 1 x daily - 3 x weekly - 2 sets - 10 reps  - Tandem Walking with Counter Support  - 1 x daily - 3 x weekly - 6 reps  - Mini Squat with Counter Support  - 1 x daily - 3 x weekly - 3 sets - 10 reps    Charges  1TE, 2NMR

## 2025-03-05 NOTE — PATIENT INSTRUCTIONS
Access Code: [email or web address]  URL: [email or web address]  Date: 03/05/2025  Prepared by: Charito Honeycutt    Program Notes  Do not complete any standing/ balance exercises if you are feeling dizzy, too tired, weak, in pain, or off-balanced. Have a firm stable chair behind you to sit. Have a counter top surface in front of to hold on to.    Exercises  - Heel Toe Raises with Counter Support  - 1 x daily - 3 x weekly - 3 sets - 10 reps  - Standing Tandem Balance with Counter Support  - 1 x daily - 3 x weekly - 3 sets - 30 sec hold  - Narrow Stance with Counter Support  - 1 x daily - 3 x weekly - 3 sets - 1 reps - 30 sec hold  - Standing March with Counter Support  - 1 x daily - 3 x weekly - 2 sets - 10 reps  - Tandem Walking with Counter Support  - 1 x daily - 3 x weekly - 6 reps  - Mini Squat with Counter Support  - 1 x daily - 3 x weekly - 3 sets - 10 reps  
Walk in

## 2025-03-10 ENCOUNTER — APPOINTMENT (OUTPATIENT)
Dept: PHYSICAL THERAPY | Age: 80
End: 2025-03-10
Attending: FAMILY MEDICINE
Payer: MEDICARE

## 2025-03-12 ENCOUNTER — OFFICE VISIT (OUTPATIENT)
Dept: PHYSICAL THERAPY | Age: 80
End: 2025-03-12
Attending: FAMILY MEDICINE
Payer: MEDICARE

## 2025-03-12 PROCEDURE — 97110 THERAPEUTIC EXERCISES: CPT | Performed by: PHYSICAL THERAPIST

## 2025-03-12 NOTE — PROGRESS NOTES
Patient: Lb Johns (80 year old, female) Referring Provider:  Insurance:   Diagnosis: Physical deconditioning (R53.81)  Generalized weakness (R53.1) Caro EDMONDS Tc  REINA [facility]   Date of Surgery: 1/14/2025 Next MD visit:  N/A   Precautions:  Hearing Impairment; Fall Risk; Visual Impairment none scheduled Referral Information:    Date of Evaluation: Req: 0, Auth: 0, Exp:     02/04/25 POC Auth Visits:  10       Progress Summary  Pt has attended 10 visits in Physical Therapy.      Today's Date   3/12/2025    Subjective  stamina is 100% better; walking is quite a bit better too; thinks if she cont to do her exercises- works on leg exercises & balance with swimming pool- thinks will be ok; upcoming travel to [place] [place] end of the month       Pain: 0/10     Summary of chief complaints: some fear of falling (less than it was). Cont to feel some times she gets off balance- pt reports she has been dx with vertigo in the past though no TX at this time. Pt has not sustained any new falls (last fall before current PT POC).  Pain level: current 0 /10, at best 0 /10, at worst 0 /10  Current limitations: walking on uneven surfaces (improved)    Has not been walking outside 2/2 weather but feels she could walk outside when the weather is nicer  Harder on grass    Objective          Musculoskeletal:  Observation/Posture: B genu valgum, WBOS     ROMA ROM and Strength:  (* denotes performed with pain)  Hip   MMT (-/5)    R L     Flex (L2) 4+ 4+     Abd 4 4-     ,   Knee   MMT (-/5)    R L     Flex 5 5     Ext (L3) 5 5     ,   Ankle/Foot   MMT (-/5)    R L     DF (L4) 5 5       Postural Control:  SLS: R: 7 sec; L: 5 sec; Fall Risk: No  Age appropriate norms for SLS: 60-68 y/o mean = 27.0 sec      70-78 y/o mean = 17.2 sec      80-98 y/o mean = 8.5 sec     Gait: pt ambulates on level ground with more normalized step length & increased foot clearance; mild decreased arm swing; no A.D.    Timed Up and Go (AD,time): 9 sec; Fall Risk:  No  [Performance exceeding the upper limit of confidence intervals are considered increased risk for falls; Theodore, 2006...   60-70 y/o mean 8.1s (7.1-9.0s)   70-80 y/o mean 9.2s (8.2-10.2s)   80-98 y/o mean 11.3s (10.0-12.7s)]      FES Score  Score: (Patient-Rptd) 75 % (2/4/2025  7:52 AM)    Score and level of concern: (Patient-Rptd) 48 - high concern (2/4/2025  7:52 AM)    Post FES Score  Post Score: (Patient-Rptd) 46.88 % (3/12/2025  1:16 PM)    Score and level of concern (post): (Patient-Rptd) 30 - high concern (3/12/2025  1:16 PM)    28.12 % improvement       Assessment  Pt has made steady improvements with strength & balance over the course of skilled PT. Pt is on track for progressing towards discharge. Will cont to benefit from several more visits emphasizing balance/ stability advancement to enable increased safety with ambulating in the community, especially in preparation for upcoming travels at end of the month.    Goals (to be met in 14 visits)    Goals       Therapy Goals      Not Met Progress Toward Partially Met Met   Pt will improve B hip abd strength to 4-/5 with the ability to walk & stand for at least 15 minutes for improved home & community participation such as grocery shopping, meal preparation, washing dishes. [] [] [] [x]   Pt will improve B hip flex strength to ascend 1 flight of stairs with use of handrail for improved community negotiation. [] [] [] [x]   Pt will demonstrate improved SLS to >10 seconds ROMA to promote safety and decrease risk of falls on uneven surfaces such as grass and gravel. [] [x] [] []   Pt will perform TUG in <10 seconds, demonstrating improved gait speed for improved community ambulation. [] [] [] [x]   Pt will be independent and compliant with comprehensive HEP to maintain progress achieved in PT. [] [] [x] []                 Plan   Continue skilled Physical Therapy 1-2 x/week or a total of 4 visits over a 90 day period. Treatment will include: Gait training;  Manual Therapy; Neuromuscular Re-education; Self-Care Home Management; Therapeutic Activities; Therapeutic Exercise; Home Exercise Program instruction        Patient/Family/Caregiver was advised of these findings, precautions, and treatment options and has agreed to actively participate in planning and for this course of care.    Thank you for your referral. If you have any questions, please contact me at Dept: [phone].    Sincerely,  Electronically signed by therapist: Charito Honeycutt PT     Physician's certification required:  Yes  Please co-sign or sign and return this letter via fax as soon as possible to [phone].   I certify the need for these services furnished under this plan of treatment and while under my care.    X___________________________________________________ Date____________________    Certification From: 3/12/2025  To:6/10/2025     Treatment Last 4 Visits       2/27/2025 3/3/2025 3/5/2025 3/12/2025   PT Treatment   Treatment Day 7 8 9 10   Therapeutic Exercise NuStep L5 X 6'  Slantboard gastroc stretch static X 2' B  Lat walk in // bars 6 laps no HHA Green TB  Fwd MW in // bars 6 laps w/ HHA Green TB  Standing 3-way hip X 20 ea R/L w/ HHA Green TB  Seated Ball Squeezes 20x5 secs  Seated Hip Abd w/ Ring 20x5 secs  Seated March 2x10 ea 1.5#  LAQ 2x10 ea 1.5#  Standing Heel/Toe Raises 3x10 ea 1.5#  Standing mini squat X 15 NuStep L6 X 6'  Slantboard gastroc stretch static X 2' B  Standing mini squat 2 X 10  Seated March 2x10 ea 1.5#  LAQ 2x10 ea 1.5#     Neuro Re-Ed Standing March on Airex 2x10 with CGA  Balance on Airex - Feet Together 3x30 secs  Tandem Balance on Floor 2x30 secs ea  Tandem Walking in //Bars x4 laps Standing March on Airex 2x10 with SBA/ CGA  Balance on Airex - Feet Together 3x30 secs  Tandem Balance on Airex 2x30 secs ea  Tandem Walking in //Bars x6 laps on Airex balance beam  Lateral Walking in //Bars x4 laps on Airex balance beam  Romberg Stance with Eyes Closed 3X30\"  Walk  with head turns R/L in // //bars X4  Obstacle course in //bars X6  Cone tapping: diagonal & cross body X 10 ea     Therapeutic Exercise Min 28 14     Neuro Re-Ed Min 10 24     Total of Timed Procedures 38 38     Total of Service Based 0 0     Total Treatment Time 38 38            2/27/2025 3/3/2025 3/5/2025 3/12/2025   Neuro Treatment   Treatment Day 7 8 9 10   Therapeutic Exercise   Standing Heel/Toe Raises 3x10 ea  Standing mini squat 2 X 10  HEP update: recommended parameters. Focus on safety.  Pt instructions: self-assess CLOF before next visit (for PN) NuStep L6 X 6' concurrent with pt education: footwear. Suggestions: [employer]'s [employer]: slip on (not sure if they have more dressier styles) SAS shoes  Re-assessment including goal status completion, QNR with education in findings  HEP review  Discharge planning   Neuro Re-Education   30 sec sit<>stand no HHA; X 2  TUG; X 5  Walk with head turns R/L in // //bars X4  Walk with head turns up-down in // //bars X4  Tandem Walking in //Bars x6 laps on Airex balance beam  Lateral Walking in //Bars x6 laps on Airex balance beam  Romberg Stance with Eyes Closed 4X30\"    Therapeutic Exercise Minutes   16 40   Neuro Re-Educ Minutes   24    Total Time Of Timed Procedures   40 40   Total Time Of Service-Based Procedures   0 0   Total Treatment Time   40 40   HEP   Access Code: [email or web address]  URL: [email or web address]  Date: 03/05/2025  Prepared by: Charito Honeycutt    Program Notes  Do not complete any standing/ balance exercises if you are feeling dizzy, too tired, weak, in pain, or off-balanced. Have a firm stable chair behind you to sit. Have a counter top surface in front of to hold on to.    Exercises  - Heel Toe Raises with Counter Support  - 1 x daily - 3 x weekly - 3 sets - 10 reps  - Standing Tandem Balance with Counter Support  - 1 x daily - 3 x weekly - 3 sets - 30 sec hold  - Narrow Stance with Counter Support  - 1 x daily - 3 x weekly - 3 sets  - 1 reps - 30 sec hold  - Standing March with Counter Support  - 1 x daily - 3 x weekly - 2 sets - 10 reps  - Tandem Walking with Counter Support  - 1 x daily - 3 x weekly - 6 reps  - Mini Squat with Counter Support  - 1 x daily - 3 x weekly - 3 sets - 10 reps         HEP  Access Code: [email or web address]  URL: [email or web address]  Date: 03/05/2025  Prepared by: Charito Honeycutt    Program Notes  Do not complete any standing/ balance exercises if you are feeling dizzy, too tired, weak, in pain, or off-balanced. Have a firm stable chair behind you to sit. Have a counter top surface in front of to hold on to.    Exercises  - Heel Toe Raises with Counter Support  - 1 x daily - 3 x weekly - 3 sets - 10 reps  - Standing Tandem Balance with Counter Support  - 1 x daily - 3 x weekly - 3 sets - 30 sec hold  - Narrow Stance with Counter Support  - 1 x daily - 3 x weekly - 3 sets - 1 reps - 30 sec hold  - Standing March with Counter Support  - 1 x daily - 3 x weekly - 2 sets - 10 reps  - Tandem Walking with Counter Support  - 1 x daily - 3 x weekly - 6 reps  - Mini Squat with Counter Support  - 1 x daily - 3 x weekly - 3 sets - 10 reps    Charges  3TE

## 2025-03-17 ENCOUNTER — OFFICE VISIT (OUTPATIENT)
Dept: PHYSICAL THERAPY | Age: 80
End: 2025-03-17
Attending: FAMILY MEDICINE
Payer: MEDICARE

## 2025-03-17 PROCEDURE — 97112 NEUROMUSCULAR REEDUCATION: CPT

## 2025-03-17 NOTE — PROGRESS NOTES
Patient: Lb Johns (80 year old, female) Referring Provider:  Insurance:   Diagnosis: Physical deconditioning (R53.81)  Generalized weakness (R53.1) Caro EDMONDS Tc  REINA [facility]   Date of Surgery: 1/14/2025 Next MD visit:  N/A   Precautions:  Hearing Impairment; Fall Risk; Visual Impairment none scheduled Referral Information:    Date of Evaluation: Req: 0, Auth: 0, Exp:     02/04/25 POC Auth Visits:  10       Today's Date   3/17/2025    Subjective  \" I feel like I feel more steady on my feet when I walk \".       Pain:       Objective               Assessment  Incorporated stepping over 6 inch hurdels and added step up / lateral step ups and steps over on air ex to further challenge lower extremities stability with gait on uneven terrain . Patient demonstrated mod unsteadiness during performance balance activities on air ex and required B UE assistance to complete the tasks .    Goals (to be met in 14 visits)   1.Pt will improve B hips abd strength to 4-/5 with the ability to walk & stand for at least 15 minutes for improved home & community participation such as gorcery shopping , meal preparation , washing dishes .  2.Pt will improve B hip flex strength to ascend 1 flight of stairs with use of handrail for improved community negotiation .  3.Pt will demonstrate improved SLS to > 10 seconds ROMA to promote safety and decrease risk of falls on uneven surfaces such as grass and gravel.  4.Pt will perform TUG IN < 10 seconds , demonstrating improved gait speed for improved community ambulation .  5.Pt will be indepemdent and compliant with comprehensive HEP to maintain progress achieved in PT .     Plan  Continue PT as per current poc.    Treatment Last 4 Visits       3/3/2025 3/5/2025 3/12/2025 3/17/2025   PT Treatment   Treatment Day 8 9 10 11   Therapeutic Exercise NuStep L6 X 6'  Slantboard gastroc stretch static X 2' B  Standing mini squat 2 X 10  Seated March 2x10 ea 1.5#  LAQ 2x10 ea 1.5#      Neuro Re-Ed  Standing March on Airex 2x10 with SBA/ CGA  Balance on Airex - Feet Together 3x30 secs  Tandem Balance on Airex 2x30 secs ea  Tandem Walking in //Bars x6 laps on Airex balance beam  Lateral Walking in //Bars x4 laps on Airex balance beam  Romberg Stance with Eyes Closed 3X30\"  Walk with head turns R/L in // //bars X4  Obstacle course in //bars X6  Cone tapping: diagonal & cross body X 10 ea      Therapeutic Exercise Min 14      Neuro Re-Ed Min 24      Total of Timed Procedures 38      Total of Service Based 0      Total Treatment Time 38             3/3/2025 3/5/2025 3/12/2025 3/17/2025   Neuro Treatment   Treatment Day 8 9 10 11   Therapeutic Exercise  Standing Heel/Toe Raises 3x10 ea  Standing mini squat 2 X 10  HEP update: recommended parameters. Focus on safety.  Pt instructions: self-assess CLOF before next visit (for PN) NuStep L6 X 6' concurrent with pt education: footwear. Suggestions: [employer]'s [employer]: slip on (not sure if they have more dressier styles) SAS shoes  Re-assessment including goal status completion, QNR with education in findings  HEP review  Discharge planning    Neuro Re-Education  30 sec sit<>stand no HHA; X 2  TUG; X 5  Walk with head turns R/L in // //bars X4  Walk with head turns up-down in // //bars X4  Tandem Walking in //Bars x6 laps on Airex balance beam  Lateral Walking in //Bars x6 laps on Airex balance beam  Romberg Stance with Eyes Closed 4X30\"  NU step x 6 min   Gastroc stretch on slant board x 5 x 20 sec hold   B heel raises x 20  B toes raises x 20   Tandem walk / retro tandem walk in // bars x 2 rounds  Stepping over 6 inch hurdles : forward / sideways 2   Balance beam : side stepping 2 rounds   Air ex :  Step ups x 20  Lateral step ups x 20   Steps over x 20  Rocking over from heels to toes and back 2 x 10  Walking forward / backward while tossing / catching yellow ball in //bard 4 rounds    Therapeutic Exercise Minutes  16 40    Neuro Re-Educ Minutes  24      Total Time Of Timed Procedures  40 40 0   Total Time Of Service-Based Procedures  0 0 0   Total Treatment Time  40 40 0   HEP  Access Code: [email or web address]  URL: [email or web address]  Date: 03/05/2025  Prepared by: Charito Honeycutt    Program Notes  Do not complete any standing/ balance exercises if you are feeling dizzy, too tired, weak, in pain, or off-balanced. Have a firm stable chair behind you to sit. Have a counter top surface in front of to hold on to.    Exercises  - Heel Toe Raises with Counter Support  - 1 x daily - 3 x weekly - 3 sets - 10 reps  - Standing Tandem Balance with Counter Support  - 1 x daily - 3 x weekly - 3 sets - 30 sec hold  - Narrow Stance with Counter Support  - 1 x daily - 3 x weekly - 3 sets - 1 reps - 30 sec hold  - Standing March with Counter Support  - 1 x daily - 3 x weekly - 2 sets - 10 reps  - Tandem Walking with Counter Support  - 1 x daily - 3 x weekly - 6 reps  - Mini Squat with Counter Support  - 1 x daily - 3 x weekly - 3 sets - 10 reps          HEP  Access Code: [email or web address]  URL: [email or web address]  Date: 03/05/2025  Prepared by: Charito Honeycutt    Program Notes  Do not complete any standing/ balance exercises if you are feeling dizzy, too tired, weak, in pain, or off-balanced. Have a firm stable chair behind you to sit. Have a counter top surface in front of to hold on to.    Exercises  - Heel Toe Raises with Counter Support  - 1 x daily - 3 x weekly - 3 sets - 10 reps  - Standing Tandem Balance with Counter Support  - 1 x daily - 3 x weekly - 3 sets - 30 sec hold  - Narrow Stance with Counter Support  - 1 x daily - 3 x weekly - 3 sets - 1 reps - 30 sec hold  - Standing March with Counter Support  - 1 x daily - 3 x weekly - 2 sets - 10 reps  - Tandem Walking with Counter Support  - 1 x daily - 3 x weekly - 6 reps  - Mini Squat with Counter Support  - 1 x daily - 3 x weekly - 3 sets - 10 reps    Charges  neuro - re- education x 3

## 2025-03-25 ENCOUNTER — OFFICE VISIT (OUTPATIENT)
Dept: PHYSICAL THERAPY | Age: 80
End: 2025-03-25
Attending: FAMILY MEDICINE
Payer: MEDICARE

## 2025-03-25 PROCEDURE — 97110 THERAPEUTIC EXERCISES: CPT | Performed by: PHYSICAL THERAPIST

## 2025-03-25 PROCEDURE — 97112 NEUROMUSCULAR REEDUCATION: CPT | Performed by: PHYSICAL THERAPIST

## 2025-03-25 NOTE — PROGRESS NOTES
Patient: Lb Johns (80 year old, female) Referring Provider:  Insurance:   Diagnosis: Physical deconditioning (R53.81)  Generalized weakness (R53.1) Caro HUANG [facility]   Date of Surgery: 1/14/2025 Next MD visit:  N/A   Precautions:  Hearing Impairment; Fall Risk; Visual Impairment none scheduled Referral Information:    Date of Evaluation: Req: 0, Auth: 0, Exp:     02/04/25 POC Auth Visits:  10       Discharge Summary  Pt has attended 12 visits in Physical Therapy.      Today's Date   3/25/2025    Subjective  Pt reports she is walking so much better. Still working on the balance. Leaving for [place] on Thursday, will be gone for 16 days.       Pain: 0/10     Objective  SLS: L 8 sec, R 8 sec (level surface); no fall risk        On 3/12/2025:      Musculoskeletal:  Observation/Posture: B genu valgum, WBOS     ROMA ROM and Strength:  (* denotes performed with pain)  Hip   MMT (-/5)    R L     Flex (L2) 4+ 4+     Abd 4 4-     ,   Knee   MMT (-/5)    R L     Flex 5 5     Ext (L3) 5 5     ,   Ankle/Foot   MMT (-/5)    R L     DF (L4) 5 5       Postural Control:  SLS: R: 7 sec; L: 5 sec; Fall Risk: No  Age appropriate norms for SLS: 60-70 y/o mean = 27.0 sec      70-80 y/o mean = 17.2 sec      80-98 y/o mean = 8.5 sec     Gait: pt ambulates on level ground with more normalized step length & increased foot clearance; mild decreased arm swing; no A.D.    Timed Up and Go (AD,time): 9 sec; Fall Risk: No  [Performance exceeding the upper limit of confidence intervals are considered increased risk for falls; Theodore, 2006...   60-70 y/o mean 8.1s (7.1-9.0s)   70-80 y/o mean 9.2s (8.2-10.2s)   80-98 y/o mean 11.3s (10.0-12.7s)]      FES Score  Score: (Patient-Rptd) 75 % (2/4/2025  7:52 AM)    Score and level of concern: (Patient-Rptd) 48 - high concern (2/4/2025  7:52 AM)    Post FES Score  Post Score: (Patient-Rptd) 46.88 % (3/12/2025  1:16 PM)    Score and level of concern (post): (Patient-Rptd) 30 - high concern  (3/12/2025  1:16 PM)    28.12 % improvement         Assessment  Pt has made steady improvements with strength & balance over the course of skilled PT. Pt verbalizes understanding w/ HEP & ability to self-alter as appropriate. Pt advised to contact PT if questions/ concerns arise while performing HEP. Pt advised to follow up with referring provider (PCP) if symptoms increase despite adherence to HEP. Pt is aware that if symptoms recur or increase, pt may be appropriate to return to skilled PT under new POC w/ updated RX if deemed medically necessary. Pt is in agreement with discharge plans. Thank you.      Goals (to be met in 14 visits)    Goals       Therapy Goals      Not Met Progress Toward Partially Met Met   Pt will improve B hip abd strength to 4-/5 with the ability to walk & stand for at least 15 minutes for improved home & community participation such as grocery shopping, meal preparation, washing dishes. [] [] [] [x]   Pt will improve B hip flex strength to ascend 1 flight of stairs with use of handrail for improved community negotiation. [] [] [] [x]   Pt will demonstrate improved SLS to >10 seconds ROMA to promote safety and decrease risk of falls on uneven surfaces such as grass and gravel. [] [x] [] []   Pt will perform TUG in <10 seconds, demonstrating improved gait speed for improved community ambulation. [] [] [] [x]   Pt will be independent and compliant with comprehensive HEP to maintain progress achieved in PT. [] [] [] [x]                 Plan  Discharge to HEP.    Patient/Family/Caregiver was advised of these findings, precautions, and treatment options and has agreed to actively participate in planning and for this course of care.    Thank you for your referral. If you have any questions, please contact me at Dept: [phone].    Sincerely,  Electronically signed by therapist: Charito Honeycutt PT     Physician's certification required:  No  Please co-sign or sign and return this letter via fax as  soon as possible to [phone].   I certify the need for these services furnished under this plan of treatment and while under my care.    X___________________________________________________ Date____________________    Certification From: 3/25/2025  To:6/23/2025     Treatment Last 4 Visits       3/5/2025 3/12/2025 3/17/2025 3/25/2025   PT Treatment   Treatment Day 9 10 11 12          3/5/2025 3/12/2025 3/17/2025 3/25/2025   Neuro Treatment   Treatment Day 9 10 11 12   Therapeutic Exercise Standing Heel/Toe Raises 3x10 ea  Standing mini squat 2 X 10  HEP update: recommended parameters. Focus on safety.  Pt instructions: self-assess CLOF before next visit (for PN) NuStep L6 X 6' concurrent with pt education: footwear. Suggestions: [employer]: slip on (not sure if they have more dressier styles) SAS shoes  Re-assessment including goal status completion, QNR with education in findings  HEP review  Discharge planning  Discharge instructions  Review shoe/ footwear recs  Standing mini squat 3 X 10  Standing hip 3-way Green TB X 15 ea R/L  Lat walk in //bars 6 laps no HHA Green TB   Neuro Re-Education 30 sec sit<>stand no HHA; X 2  TUG; X 5  Walk with head turns R/L in // //bars X4  Walk with head turns up-down in // //bars X4  Tandem Walking in //Bars x6 laps on Airex balance beam  Lateral Walking in //Bars x6 laps on Airex balance beam  Romberg Stance with Eyes Closed 4X30\"  NU step x 6 min   Gastroc stretch on slant board x 5 x 20 sec hold   B heel raises x 20  B toes raises x 20   Tandem walk / retro tandem walk in // bars x 2 rounds  Stepping over 6 inch hurdles : forward / sideways 2   Balance beam : side stepping 2 rounds   Air ex :  Step ups x 20  Lateral step ups x 20   Steps over x 20  Rocking over from heels to toes and back 2 x 10  Walking forward / backward while tossing / catching yellow ball in //bard 4 rounds  Walk with head turns R/L in //bars x4 laps  Walk with head turns up-down in  //bars x4 laps  Tandem Walking in //Bars x8 laps on Airex balance beam  Lateral Walking in //Bars x8 laps on Airex balance beam  High knees march in //Bars x6 laps on Airex balance beam  Walking forward / backward while tossing / catching yellow ball in //bars 4 rounds  Standing yellow ball toss to<>from PT (PT progressively moving further backwards): level stance feet together->airex foam feet together X 20  Multidirectional reaches with ball with feet together balance airex foam X 20   Therapeutic Exercise Minutes 16 40  15   Neuro Re-Educ Minutes 24   24   Total Time Of Timed Procedures 40 40 0 39   Total Time Of Service-Based Procedures 0 0 0 0   Total Treatment Time 40 40 0 39   HEP Access Code: [email or web address]  URL: [email or web address]  Date: 03/05/2025  Prepared by: Charito Honeycutt    Program Notes  Do not complete any standing/ balance exercises if you are feeling dizzy, too tired, weak, in pain, or off-balanced. Have a firm stable chair behind you to sit. Have a counter top surface in front of to hold on to.    Exercises  - Heel Toe Raises with Counter Support  - 1 x daily - 3 x weekly - 3 sets - 10 reps  - Standing Tandem Balance with Counter Support  - 1 x daily - 3 x weekly - 3 sets - 30 sec hold  - Narrow Stance with Counter Support  - 1 x daily - 3 x weekly - 3 sets - 1 reps - 30 sec hold  - Standing March with Counter Support  - 1 x daily - 3 x weekly - 2 sets - 10 reps  - Tandem Walking with Counter Support  - 1 x daily - 3 x weekly - 6 reps  - Mini Squat with Counter Support  - 1 x daily - 3 x weekly - 3 sets - 10 reps           HEP  Access Code: [email or web address]  URL: [email or web address]  Date: 03/05/2025  Prepared by: Charito Honeycutt    Program Notes  Do not complete any standing/ balance exercises if you are feeling dizzy, too tired, weak, in pain, or off-balanced. Have a firm stable chair behind you to sit. Have a counter top surface in front of to hold on to.    Exercises  -  Heel Toe Raises with Counter Support  - 1 x daily - 3 x weekly - 3 sets - 10 reps  - Standing Tandem Balance with Counter Support  - 1 x daily - 3 x weekly - 3 sets - 30 sec hold  - Narrow Stance with Counter Support  - 1 x daily - 3 x weekly - 3 sets - 1 reps - 30 sec hold  - Standing March with Counter Support  - 1 x daily - 3 x weekly - 2 sets - 10 reps  - Tandem Walking with Counter Support  - 1 x daily - 3 x weekly - 6 reps  - Mini Squat with Counter Support  - 1 x daily - 3 x weekly - 3 sets - 10 reps    Charges  1TE, 2NMR

## 2025-06-12 ENCOUNTER — HOSPITAL ENCOUNTER (OUTPATIENT)
Dept: MAMMOGRAPHY | Age: 80
Discharge: HOME | End: 2025-06-12
Attending: FAMILY MEDICINE
Payer: MEDICARE

## 2025-06-12 ENCOUNTER — OFFICE VISIT (OUTPATIENT)
Dept: FAMILY MEDICINE CLINIC | Facility: CLINIC | Age: 80
End: 2025-06-12
Payer: MEDICARE

## 2025-06-12 VITALS
OXYGEN SATURATION: 97 % | SYSTOLIC BLOOD PRESSURE: 128 MMHG | HEART RATE: 93 BPM | RESPIRATION RATE: 18 BRPM | DIASTOLIC BLOOD PRESSURE: 82 MMHG | TEMPERATURE: 99 F

## 2025-06-12 DIAGNOSIS — I47.10 PAROXYSMAL SVT (SUPRAVENTRICULAR TACHYCARDIA) (HCC): ICD-10-CM

## 2025-06-12 DIAGNOSIS — E78.5 HYPERLIPIDEMIA, UNSPECIFIED HYPERLIPIDEMIA TYPE: ICD-10-CM

## 2025-06-12 DIAGNOSIS — Z86.73 HISTORY OF TIA (TRANSIENT ISCHEMIC ATTACK): ICD-10-CM

## 2025-06-12 DIAGNOSIS — E66.811 OBESITY (BMI 30.0-34.9): ICD-10-CM

## 2025-06-12 DIAGNOSIS — R73.9 HYPERGLYCEMIA: ICD-10-CM

## 2025-06-12 DIAGNOSIS — Q63.1 HORSESHOE KIDNEY: ICD-10-CM

## 2025-06-12 DIAGNOSIS — Z12.31 ENCOUNTER FOR SCREENING MAMMOGRAM FOR MALIGNANT NEOPLASM OF BREAST: ICD-10-CM

## 2025-06-12 DIAGNOSIS — R73.03 PREDIABETES: ICD-10-CM

## 2025-06-12 DIAGNOSIS — R41.3 MEMORY LOSS OR IMPAIRMENT: ICD-10-CM

## 2025-06-12 DIAGNOSIS — R26.81 UNSTEADINESS ON FEET: ICD-10-CM

## 2025-06-12 DIAGNOSIS — I10 ESSENTIAL HYPERTENSION: ICD-10-CM

## 2025-06-12 DIAGNOSIS — N18.30 STAGE 3 CHRONIC KIDNEY DISEASE, UNSPECIFIED WHETHER STAGE 3A OR 3B CKD (HCC): Chronic | ICD-10-CM

## 2025-06-12 DIAGNOSIS — Z00.00 ENCOUNTER FOR ANNUAL HEALTH EXAMINATION: Primary | ICD-10-CM

## 2025-06-12 DIAGNOSIS — Z98.84 LAP-BAND SURGERY STATUS: ICD-10-CM

## 2025-06-12 DIAGNOSIS — F33.0 MILD RECURRENT MAJOR DEPRESSION: Chronic | ICD-10-CM

## 2025-06-12 LAB
ALBUMIN SERPL-MCNC: 4.8 G/DL (ref 3.2–4.8)
ALBUMIN/GLOB SERPL: 1.6 {RATIO} (ref 1–2)
ALP LIVER SERPL-CCNC: 80 U/L (ref 55–142)
ALT SERPL-CCNC: 16 U/L (ref 10–49)
ANION GAP SERPL CALC-SCNC: 10 MMOL/L (ref 0–18)
AST SERPL-CCNC: 25 U/L (ref ?–34)
BASOPHILS # BLD AUTO: 0.05 X10(3) UL (ref 0–0.2)
BASOPHILS NFR BLD AUTO: 0.8 %
BILIRUB SERPL-MCNC: 0.3 MG/DL (ref 0.2–1.1)
BUN BLD-MCNC: 24 MG/DL (ref 9–23)
CALCIUM BLD-MCNC: 9.5 MG/DL (ref 8.7–10.6)
CHLORIDE SERPL-SCNC: 107 MMOL/L (ref 98–112)
CHOLEST SERPL-MCNC: 231 MG/DL (ref ?–200)
CO2 SERPL-SCNC: 26 MMOL/L (ref 21–32)
CREAT BLD-MCNC: 1.45 MG/DL (ref 0.55–1.02)
EGFRCR SERPLBLD CKD-EPI 2021: 37 ML/MIN/1.73M2 (ref 60–?)
EOSINOPHIL # BLD AUTO: 0.15 X10(3) UL (ref 0–0.7)
EOSINOPHIL NFR BLD AUTO: 2.5 %
ERYTHROCYTE [DISTWIDTH] IN BLOOD BY AUTOMATED COUNT: 14.8 %
EST. AVERAGE GLUCOSE BLD GHB EST-MCNC: 123 MG/DL (ref 68–126)
FASTING PATIENT LIPID ANSWER: NO
FASTING STATUS PATIENT QL REPORTED: NO
GLOBULIN PLAS-MCNC: 3 G/DL (ref 2–3.5)
GLUCOSE BLD-MCNC: 107 MG/DL (ref 70–99)
HBA1C MFR BLD: 5.9 % (ref ?–5.7)
HCT VFR BLD AUTO: 43.6 % (ref 35–48)
HDLC SERPL-MCNC: 69 MG/DL (ref 40–59)
HGB BLD-MCNC: 13.7 G/DL (ref 12–16)
IMM GRANULOCYTES # BLD AUTO: 0.01 X10(3) UL (ref 0–1)
IMM GRANULOCYTES NFR BLD: 0.2 %
LDLC SERPL CALC-MCNC: 133 MG/DL (ref ?–100)
LYMPHOCYTES # BLD AUTO: 2.1 X10(3) UL (ref 1–4)
LYMPHOCYTES NFR BLD AUTO: 35.4 %
MCH RBC QN AUTO: 28.6 PG (ref 26–34)
MCHC RBC AUTO-ENTMCNC: 31.4 G/DL (ref 31–37)
MCV RBC AUTO: 91 FL (ref 80–100)
MONOCYTES # BLD AUTO: 0.42 X10(3) UL (ref 0.1–1)
MONOCYTES NFR BLD AUTO: 7.1 %
NEUTROPHILS # BLD AUTO: 3.21 X10 (3) UL (ref 1.5–7.7)
NEUTROPHILS # BLD AUTO: 3.21 X10(3) UL (ref 1.5–7.7)
NEUTROPHILS NFR BLD AUTO: 54 %
NONHDLC SERPL-MCNC: 162 MG/DL (ref ?–130)
OSMOLALITY SERPL CALC.SUM OF ELEC: 301 MOSM/KG (ref 275–295)
PLATELET # BLD AUTO: 245 10(3)UL (ref 150–450)
POTASSIUM SERPL-SCNC: 4.9 MMOL/L (ref 3.5–5.1)
PROT SERPL-MCNC: 7.8 G/DL (ref 5.7–8.2)
RBC # BLD AUTO: 4.79 X10(6)UL (ref 3.8–5.3)
SODIUM SERPL-SCNC: 143 MMOL/L (ref 136–145)
TRIGL SERPL-MCNC: 164 MG/DL (ref 30–149)
TSI SER-ACNC: 2.54 UIU/ML (ref 0.55–4.78)
VLDLC SERPL CALC-MCNC: 30 MG/DL (ref 0–30)
WBC # BLD AUTO: 5.9 X10(3) UL (ref 4–11)

## 2025-06-12 PROCEDURE — 83036 HEMOGLOBIN GLYCOSYLATED A1C: CPT | Performed by: FAMILY MEDICINE

## 2025-06-12 PROCEDURE — 84443 ASSAY THYROID STIM HORMONE: CPT | Performed by: FAMILY MEDICINE

## 2025-06-12 PROCEDURE — 77067 SCR MAMMO BI INCL CAD: CPT | Performed by: FAMILY MEDICINE

## 2025-06-12 PROCEDURE — 80053 COMPREHEN METABOLIC PANEL: CPT | Performed by: FAMILY MEDICINE

## 2025-06-12 PROCEDURE — 77063 BREAST TOMOSYNTHESIS BI: CPT | Performed by: FAMILY MEDICINE

## 2025-06-12 PROCEDURE — 85025 COMPLETE CBC W/AUTO DIFF WBC: CPT | Performed by: FAMILY MEDICINE

## 2025-06-12 PROCEDURE — 80061 LIPID PANEL: CPT | Performed by: FAMILY MEDICINE

## 2025-06-12 NOTE — PROGRESS NOTES
HPI:   Lb Johns is a 80 year old female who presents for a Medicare Subsequent Annual Wellness visit (Pt already had Initial Annual Wellness).    Had lap band removed and is now struggling with weight again. Regained 10 lbs. Would like to do something about that.     Living up here permanently now. Closer to her daughter and grandsons and friends.     She is more steady since doing PT last year. Has not fallen.     Goes to skin doctor regularly, no cancerous findings but several things were treated.     SVT: was seeing cardiology in [facility]. Needs a referral for cardiology up in [place].   About a year ago had heart testing that was all normal.         Colonoscopy: up to date.   Mammogram: up to date.   Labs: due   Imm: had pneumonia series and shingles, up to date.   Eye appointment was recent. Has a wrinkle on retina they are watching.     Patient Care Team: Patient Care Team:  Caro Montez DO as PCP - General (Family Medicine)  Charito Honeycutt, PT as Physical Therapist  Sandra Carbajal, PT as Physical Therapist  Nabila Womack, PTA as Physical Therapy Assistant (Physical Therapy)       Depression - moods have been okay lately, on bupropion.      Essential hypertension - good.      Horseshoe kidney - stable.     Last Cholesterol Labs:   Lab Results   Component Value Date    CHOLEST 231 (H) 06/12/2025    HDL 69 (H) 06/12/2025     (H) 06/12/2025    TRIG 164 (H) 06/12/2025          Last Chemistry Labs:   Lab Results   Component Value Date    AST 25 06/12/2025    ALT 16 06/12/2025    CA 9.5 06/12/2025    ALB 4.8 06/12/2025    TSH 2.537 06/12/2025    CREATSERUM 1.45 (H) 06/12/2025     (H) 06/12/2025        CBC  (most recent labs)   Lab Results   Component Value Date    WBC 5.9 06/12/2025    HGB 13.7 06/12/2025    .0 06/12/2025        ALLERGIES:   She is allergic to betadine [povidone iodine] and nifedical xl [nifedipine].    CURRENT MEDICATIONS:   Outpatient Medications Marked as Taking for  the 25 encounter (Office Visit) with Caro Montez, DO   Medication Sig    semaglutide-weight management 0.25 MG/0.5ML Subcutaneous Solution Auto-injector Inject 0.5 mL (0.25 mg total) into the skin once a week for 4 doses.    BUPROPION HCL ER, SMOKING DET, 150 MG Oral Tablet 12 Hr TAKE 1 TABLET(150 MG) BY MOUTH TWICE DAILY    donepezil 10 MG Oral Tab Take 1 tablet (10 mg total) by mouth nightly.    flecainide 50 MG Oral Tab Take 1 tablet (50 mg total) by mouth Q12H.    metoprolol succinate ER 25 MG Oral Tablet 24 Hr Take 1 tablet (25 mg total) by mouth daily.    Ferrous Gluconate 239 (27 Fe) MG Oral Tab As Directed.    MULTIPLE VITAMINS ESSENTIAL OR 1 Dose.    aspirin 81 MG Oral Tab Take 1 tablet (81 mg total) by mouth daily.      MEDICAL INFORMATION:   She  has a past medical history of Arm fracture, right (), Depression, Essential hypertension, and History of arm fracture (2016).    She  has a past surgical history that includes tubal ligation; ; and lap gastr bypass incl ll i.    Her family history includes Breast Cancer (age of onset: 55) in her maternal cousin female; Breast Cancer (age of onset: 80) in her maternal aunt; Breast Cancer (age of onset: 90) in her mother; Diabetes in her father and mother; Heart Disorder in her father; Other in her mother; Psychiatric in her daughter.   SOCIAL HISTORY:   She  reports that she has never smoked. She has never been exposed to tobacco smoke. She has never used smokeless tobacco. She reports current alcohol use. She reports that she does not use drugs.     REVIEW OF SYSTEMS:   GENERAL: feels well otherwise, no fevers.   SKIN: denies any unusual skin lesions  EYES: denies blurred vision or double vision   HEENT: denies nasal congestion, sinus pain or ST  LUNGS: denies shortness of breath with exertion  CARDIOVASCULAR: denies chest pain on exertion  GI: denies abdominal pain, denies heartburn  : denies dysuria, vaginal discharge or itching, no  complaint of urinary incontinence    MUSCULOSKELETAL: denies back pain or significant joint pain   NEURO: denies headaches,   PSYCHE: + as above   HEMATOLOGIC: denies hx of anemia  ENDOCRINE: denies thyroid history  ALL/ASTHMA: denies hx of allergy or asthma    EXAM:   /82   Pulse 93   Temp 98.5 °F (36.9 °C) (Temporal)   Resp 18   SpO2 97%  Estimated body mass index is 30.45 kg/m² as calculated from the following:    Height as of 7/4/24: 5' 5\" (1.651 m).    Weight as of 1/27/25: 183 lb (83 kg).    Medicare Hearing Assessment  (Required for AWV/SWV)    Questionnaire     Visual Acuity - following with eye doctor, s/p cataract surgery, last seen in September.   Right Eye Visual Acuity: Corrected Right Eye Chart Acuity: 20/30   Left Eye Visual Acuity: Corrected Left Eye Chart Acuity: 20/25   Both Eyes Visual Acuity: Corrected Both Eyes Chart Acuity: 20/25          General Appearance:  Alert, cooperative, no distress, appears stated age   Head:  Normocephalic, without obvious abnormality, atraumatic   Eyes:  PERRL, conjunctiva/corneas clear, EOM's intact both eyes   Ears:  Normal TM's and external ear canals, both ears   Nose: Nares normal, septum midline,mucosa normal, no drainage or sinus tenderness   Throat: Lips, mucosa, and tongue normal; teeth and gums normal   Neck: Supple, symmetrical, trachea midline, no adenopathy;  thyroid: not enlarged, symmetric, no tenderness/mass/nodules;     Back:   Symmetric, no curvature, ROM normal, no CVA tenderness   Lungs:   Clear to auscultation bilaterally, respirations unlabored   Heart:  Regular rate and rhythm, S1 and S2 normal, no murmur, rub, or gallop   Abdomen:   Soft, non-tender, bowel sounds active all four quadrants,  no masses, no organomegaly   Pelvic: Deferred   Extremities: Extremities normal, atraumatic, no cyanosis or edema   Pulses: 2+ and symmetric   Skin: Skin color, texture, turgor normal, many skin lesions, scars and moles all over body    Lymph  nodes: Cervical, supraclavicular, nodes normal   Neurologic: Normal         SUGGESTED VACCINATIONS - Influenza, Pneumococcal, Zoster, Tetanus     Immunization History   Administered Date(s) Administered    Influenza 10/01/2007, 10/08/2008, 09/01/2012    Pneumococcal (Prevnar 13) 04/07/2014    Td, Preserv Free 06/23/2016    Zoster (Shingles) 09/04/2008       ASSESSMENT AND OTHER RELEVANT CHRONIC CONDITIONS:   Lb Johns is a 80 year old female who presents for a Medicare Assessment.     PLAN SUMMARY:   Diagnoses and all orders for this visit:  1. Encounter for annual health examination  Completed today.   - CBC With Differential With Platelet  - Comp Metabolic Panel (14)  - Hemoglobin A1C  - Lipid Panel  - TSH W Reflex To Free T4  - Cardio Referral - Internal    2. Mild recurrent major depression  Doing well with current meds     3. Stage 3 chronic kidney disease, unspecified whether stage 3a or 3b CKD (HCC)  Check labs for monitoring. Avoid NSAIDS.     4. Paroxysmal SVT (supraventricular tachycardia) (HCC)  Stable, refer back to cardiology for follow up. She saw Dr. Curry last year.     5. Essential hypertension  Controlled with current meds. Check labs.   - CBC With Differential With Platelet  - Comp Metabolic Panel (14)  - Lipid Panel  - TSH W Reflex To Free T4  - Cardio Referral - Internal    6. Hyperlipidemia, unspecified hyperlipidemia type  Not on a statin. Check labs.   - semaglutide-weight management 0.25 MG/0.5ML Subcutaneous Solution Auto-injector; Inject 0.5 mL (0.25 mg total) into the skin once a week for 4 doses.  Dispense: 2 mL; Refill: 0    7. Obesity (BMI 30.0-34.9)  Will see if insurance with cover wegovy.. she can also follow up with her bariatric doctor.   - semaglutide-weight management 0.25 MG/0.5ML Subcutaneous Solution Auto-injector; Inject 0.5 mL (0.25 mg total) into the skin once a week for 4 doses.  Dispense: 2 mL; Refill: 04  H/o TIA     8. Prediabetes  Will see if wegovy is covered.    - semaglutide-weight management 0.25 MG/0.5ML Subcutaneous Solution Auto-injector; Inject 0.5 mL (0.25 mg total) into the skin once a week for 4 doses.  Dispense: 2 mL; Refill: 0    9. LAP-BAND surgery status  Now removed recently. She is feeling better.     10. Memory loss or impairment  Stable, will monitor.     11. Horseshoe kidney  Monitor kidney function.     12. Unsteadiness on feet  Better since doing PT, recommend continued exercise. She swims regularly    13. Hyperglycemia  Check A1c.   - Hemoglobin A1C    Ms. Johns already takes aspirin and has it on her medication list.     Diet assessment: fair     Advanced Directive:  Living Will on file in [facility]?  Lb Johns does not have a Living Will on file in [facility]. Discussed with patient and provided information      Healthcare Power of  on file in [facility]:    Lb Johns does not have a Power of  for Health Care on file in [facility]. Discussed with patient and provided information          PLAN:  The patient indicates understanding of these issues and agrees to the plan.    Return for Wellness Visit.     Caro Montez DO, 3/13/2017     General Health     In the past six months, have you lost more than 10 pounds without trying?: 2 - No    Has your appetite been poor?: No    How does the patient maintain a good energy level?: Appropriate Exercise, Daily Walks    How would you describe your daily physical activity?: Moderate    How would you describe your current health state?: Good    How do you maintain positive mental well-being?: Social Interaction, Visiting Family         Have you had any immunizations at another office such as Influenza, Hepatitis B, Tetanus, or Pneumococcal?: Yes     Functional Ability     Bathing or Showering: Able without help    Toileting: Able without help    Dressing: Able without help    Eating: Able without help    Driving: Able without help    Preparing your meals: Able without help    Managing money/bills: Able without  help    Taking medications as prescribed: Able without help    Are you able to afford your medications?: Yes    Hearing Problems?: No     Functional Status     Hearing Problems?: No    Vision Problems? : Yes    Difficulty walking?: Yes    Difficulty dressing or bathing?: Yes    Problems with daily activities? : No           Fall/Risk Assessment                                                              Depression Screening (PHQ-2/PHQ-9): Over the LAST 2 WEEKS                      Advance Directives     Do you have a healthcare power of ?: No    Do you have a living will?: No     Please go to \"Cognitive Assessment\" under Medicare Assessment section in Charting, test patient and document.    Then, refresh your progress note to see your input here.  Cognitive Assessment     What day of the week is this?: Correct    What month is it?: Correct    What year is it?: Correct    Recall \"Ball\": Correct    Recall \"Flag\": Correct    Recall \"Tree\": Correct       This section provided for quick review of chart, separate sheet to patient  PREVENTATIVE SERVICES  INDICATIONS AND SCHEDULE Internal Lab or Procedure External Lab or Procedure   Diabetes Screening      HbgA1C   Annually HEMOGLOBIN A1c (% of total Hgb)   Date Value   02/14/2020 5.5     HgbA1C (%)   Date Value   06/12/2025 5.9 (H)         No data to display                Fasting Blood Sugar (FSB)Annually Glucose (mg/dL)   Date Value   06/12/2025 107 (H)     GLUCOSE (mg/dL)   Date Value   09/21/2019 100 (H)          Cardiovascular Disease Screening     LDL Annually LDL Cholesterol (mg/dL)   Date Value   06/12/2025 133 (H)        EKG - w/ Initial Preventative Physical Exam only, or if medically necessary Electrocardiogram date       Colorectal Cancer Screening      Colonoscopy Screen every 10 years Health Maintenance   Topic Date Due    Colorectal Cancer Screening  Discontinued    Update Health Maintenance if applicable    Flex Sigmoidoscopy Screen every 10 years  No results found for this or any previous visit.      No data to display                 Fecal Occult Blood Annually No results found for: \"FOB\"      No data to display                Glaucoma Screening      Ophthalmology Visit Annually: Diabetics, FHx Glaucoma, AA>50, > 65      No data to display                Bone Density Screening      Dexascan Every two years No results found for this or any previous visit.        No data to display                Pap and Pelvic      Pap: Every 3 yrs age 21-65 or Pap+HPV every 5 yrs age 30-65, age 65 and older at high risk No recommendations at this time Update Health Maintenance if applicable    Chlamydia  Annually if high risk No results found for: \"CHLAMYDIA\"      No data to display                Screening Mammogram      Mammogram Annually to 75, then as discussed Health Maintenance   Topic Date Due    Mammogram  Discontinued    Update Health Maintenance if applicable     Immunizations      Influenza Orders placed or performed in visit on 09/20/19    Fluzone High Dose 65 yr and up [69050]    Update Immunization Activity if applicable    Pneumoccocal 13 (Prevnar) No orders found for this or any previous visit.      Pneumococcal 23 (Pneumovax) No orders found for this or any previous visit.     Hepatitis B No orders found for this or any previous visit.     Tetanus Orders placed or performed in visit on 06/23/16    Td (Tenivac) (10214) (DX V06.5/Z23)            SPECIFIC DISEASE MONITORING Internal Lab or Procedure External Lab or Procedure   Annual Monitoring of Persistent     Medications (ACE/ARB, digoxin diuretics, anticonvulsants.)    Potassium  Annually Potassium (mmol/L)   Date Value   06/12/2025 4.9     POTASSIUM (mmol/L)   Date Value   09/21/2019 4.5         No data to display                Creatinine  Annually CREATININE (mg/dL)   Date Value   09/21/2019 0.99 (H)     Creatinine (mg/dL)   Date Value   06/12/2025 1.45 (H)         No data to display                 BUN  Annually BUN (mg/dL)   Date Value   06/12/2025 24 (H)     UREA NITROGEN (BUN) (mg/dL)   Date Value   09/21/2019 28 (H)         No data to display                 Drug Serum Conc  Annually No results found for: \"DIGOXIN\", \"DIG\", \"VALP\"      No data to display                Diabetes      HgbA1C  Annually HEMOGLOBIN A1c (% of total Hgb)   Date Value   02/14/2020 5.5     HgbA1C (%)   Date Value   06/12/2025 5.9 (H)         No data to display                Creat/alb ratio  Annually      LDL  Annually LDL Cholesterol (mg/dL)   Date Value   06/12/2025 133 (H)         No data to display                 Dilated Eye exam  Annually      No data to display                   No data to display                COPD      Spirometry Testing Annually Spirometry date:       No data to display                     Template: TOM RUTLEDGE MEDICARE ANNUAL ASSESSMENT FEMALE [31242]

## 2025-06-26 RX ORDER — BUPROPION HYDROCHLORIDE 150 MG/1
150 TABLET, FILM COATED, EXTENDED RELEASE ORAL 2 TIMES DAILY
Qty: 180 TABLET | Refills: 0 | Status: SHIPPED | OUTPATIENT
Start: 2025-06-26

## (undated) NOTE — MR AVS SNAPSHOT
[address] [facility] [id number]  [phone]               Thank you for choosing us for your health care visit [facility]   We are glad to serve you and happy to provide you with this Make half your plate fruits and vegetables Highly refined, white starches including white bread, rice and pasta   Eat plenty of protein, keep the fat content low Sugars:  sodas and sports drinks, candies and desserts   Eat plenty of low-fat dairy products

## (undated) NOTE — LETTER
Lb Johns   [address] [facility] [address]           Dear Lb Johns     Our records indicate that you have outstanding lab work and or testing that was ordered for you and has not yet been completed:  Lab Frequency Next Occurrence   IRISH JESUS 2D+3D SCREENING BILAT (CPT=77067/66623) Once 07/01/2024      To provide you with the best possible care, please complete these orders at your earliest convenience. If you have recently completed these orders please disregard this letter.     If you have any questions please call the office at [phone].     Thank you,     [facility]

## (undated) NOTE — LETTER
01/07/19        [address]  [place] [address]      Dear Harman Friend records indicate that you have outstanding lab work and or testing that was ordered for you and has not yet been completed:  Lab Frequency Next Occurrence   XR DEX

## (undated) NOTE — LETTER
[address] [facility]           Dear Isacc Groves     Our records indicate that you have outstanding lab work and or testing that was ordered for you and has not yet been completed:     Order Code Tests Ordered (Total: 3)    Order Code Tests Ordered      6399 CBC WITH DIFFERENTIAL WITH PLATELET   70604 COMP METABOLIC PANEL (14)    8585 LIPID PANEL          To provide you with the best possible care, please complete these orders at your earliest convenience. If you have recently completed these orders please disregard this letter. If you have any questions please call the office at [phone].      Thank you,     [facility]

## (undated) NOTE — LETTER
09/27/17      HORTENSIA BROWN  [address]  [place] [address]        Dear Daryn Lopez      To help us provide the highest quality medical care, [facility] uses a sophisticated computer system to track our patient records.  During a re

## (undated) NOTE — LETTER
03/13/20    [address] [facility] [id number]      [facility]. To help us provide the highest quality medical care, [facility] uses a sophisticated computer system to track our patient records.  During